# Patient Record
Sex: FEMALE | Race: BLACK OR AFRICAN AMERICAN | NOT HISPANIC OR LATINO | Employment: FULL TIME | URBAN - METROPOLITAN AREA
[De-identification: names, ages, dates, MRNs, and addresses within clinical notes are randomized per-mention and may not be internally consistent; named-entity substitution may affect disease eponyms.]

---

## 2020-03-21 ENCOUNTER — HOSPITAL ENCOUNTER (EMERGENCY)
Facility: HOSPITAL | Age: 45
Discharge: HOME | End: 2020-03-21
Attending: EMERGENCY MEDICINE
Payer: COMMERCIAL

## 2020-03-21 ENCOUNTER — NURSE TRIAGE (OUTPATIENT)
Dept: OTHER | Facility: OTHER | Age: 45
End: 2020-03-21

## 2020-03-21 VITALS
WEIGHT: 200 LBS | BODY MASS INDEX: 36.8 KG/M2 | DIASTOLIC BLOOD PRESSURE: 73 MMHG | TEMPERATURE: 100 F | HEIGHT: 62 IN | HEART RATE: 96 BPM | SYSTOLIC BLOOD PRESSURE: 115 MMHG | RESPIRATION RATE: 16 BRPM | OXYGEN SATURATION: 99 %

## 2020-03-21 DIAGNOSIS — R55 SYNCOPE: Primary | ICD-10-CM

## 2020-03-21 DIAGNOSIS — B34.9 VIRAL ILLNESS: ICD-10-CM

## 2020-03-21 DIAGNOSIS — R55 VASOVAGAL EPISODE: ICD-10-CM

## 2020-03-21 LAB
ALBUMIN SERPL BCP-MCNC: 3.1 G/DL (ref 3.5–5)
ALP SERPL-CCNC: 74 U/L (ref 46–116)
ALT SERPL W P-5'-P-CCNC: 18 U/L (ref 12–78)
ANION GAP SERPL CALCULATED.3IONS-SCNC: 6 MMOL/L (ref 4–13)
AST SERPL W P-5'-P-CCNC: 21 U/L (ref 5–45)
ATRIAL RATE: 85 BPM
BASOPHILS # BLD AUTO: 0.02 THOUSANDS/ΜL (ref 0–0.1)
BASOPHILS NFR BLD AUTO: 0 % (ref 0–1)
BILIRUB DIRECT SERPL-MCNC: 0.1 MG/DL (ref 0–0.2)
BILIRUB SERPL-MCNC: 0.2 MG/DL (ref 0.2–1)
BUN SERPL-MCNC: 11 MG/DL (ref 5–25)
CALCIUM SERPL-MCNC: 8.2 MG/DL (ref 8.3–10.1)
CHLORIDE SERPL-SCNC: 104 MMOL/L (ref 100–108)
CO2 SERPL-SCNC: 27 MMOL/L (ref 21–32)
CREAT SERPL-MCNC: 0.89 MG/DL (ref 0.6–1.3)
EOSINOPHIL # BLD AUTO: 0.03 THOUSAND/ΜL (ref 0–0.61)
EOSINOPHIL NFR BLD AUTO: 1 % (ref 0–6)
ERYTHROCYTE [DISTWIDTH] IN BLOOD BY AUTOMATED COUNT: 15.9 % (ref 11.6–15.1)
GFR SERPL CREATININE-BSD FRML MDRD: 92 ML/MIN/1.73SQ M
GLUCOSE SERPL-MCNC: 91 MG/DL (ref 65–140)
HCG SERPL QL: NEGATIVE
HCT VFR BLD AUTO: 37.4 % (ref 34.8–46.1)
HGB BLD-MCNC: 10.9 G/DL (ref 11.5–15.4)
IMM GRANULOCYTES # BLD AUTO: 0.02 THOUSAND/UL (ref 0–0.2)
IMM GRANULOCYTES NFR BLD AUTO: 0 % (ref 0–2)
LYMPHOCYTES # BLD AUTO: 0.76 THOUSANDS/ΜL (ref 0.6–4.47)
LYMPHOCYTES NFR BLD AUTO: 17 % (ref 14–44)
MCH RBC QN AUTO: 27.5 PG (ref 26.8–34.3)
MCHC RBC AUTO-ENTMCNC: 29.1 G/DL (ref 31.4–37.4)
MCV RBC AUTO: 94 FL (ref 82–98)
MONOCYTES # BLD AUTO: 0.65 THOUSAND/ΜL (ref 0.17–1.22)
MONOCYTES NFR BLD AUTO: 14 % (ref 4–12)
NEUTROPHILS # BLD AUTO: 3.09 THOUSANDS/ΜL (ref 1.85–7.62)
NEUTS SEG NFR BLD AUTO: 68 % (ref 43–75)
NRBC BLD AUTO-RTO: 0 /100 WBCS
P AXIS: 49 DEGREES
PLATELET # BLD AUTO: 320 THOUSANDS/UL (ref 149–390)
PMV BLD AUTO: 10 FL (ref 8.9–12.7)
POTASSIUM SERPL-SCNC: 3.9 MMOL/L (ref 3.5–5.3)
PR INTERVAL: 128 MS
PROT SERPL-MCNC: 7 G/DL (ref 6.4–8.2)
QRS AXIS: 15 DEGREES
QRSD INTERVAL: 70 MS
QT INTERVAL: 342 MS
QTC INTERVAL: 406 MS
RBC # BLD AUTO: 3.97 MILLION/UL (ref 3.81–5.12)
SODIUM SERPL-SCNC: 137 MMOL/L (ref 136–145)
T WAVE AXIS: 17 DEGREES
TROPONIN I SERPL-MCNC: <0.02 NG/ML
VENTRICULAR RATE: 85 BPM
WBC # BLD AUTO: 4.57 THOUSAND/UL (ref 4.31–10.16)

## 2020-03-21 PROCEDURE — 36415 COLL VENOUS BLD VENIPUNCTURE: CPT | Performed by: EMERGENCY MEDICINE

## 2020-03-21 PROCEDURE — 93010 ELECTROCARDIOGRAM REPORT: CPT | Performed by: INTERNAL MEDICINE

## 2020-03-21 PROCEDURE — 84703 CHORIONIC GONADOTROPIN ASSAY: CPT | Performed by: EMERGENCY MEDICINE

## 2020-03-21 PROCEDURE — 99285 EMERGENCY DEPT VISIT HI MDM: CPT | Performed by: EMERGENCY MEDICINE

## 2020-03-21 PROCEDURE — 80076 HEPATIC FUNCTION PANEL: CPT | Performed by: EMERGENCY MEDICINE

## 2020-03-21 PROCEDURE — 87635 SARS-COV-2 COVID-19 AMP PRB: CPT | Performed by: EMERGENCY MEDICINE

## 2020-03-21 PROCEDURE — 80048 BASIC METABOLIC PNL TOTAL CA: CPT | Performed by: EMERGENCY MEDICINE

## 2020-03-21 PROCEDURE — 84484 ASSAY OF TROPONIN QUANT: CPT | Performed by: EMERGENCY MEDICINE

## 2020-03-21 PROCEDURE — 99284 EMERGENCY DEPT VISIT MOD MDM: CPT

## 2020-03-21 PROCEDURE — 85025 COMPLETE CBC W/AUTO DIFF WBC: CPT | Performed by: EMERGENCY MEDICINE

## 2020-03-21 PROCEDURE — 93005 ELECTROCARDIOGRAM TRACING: CPT

## 2020-03-21 RX ADMIN — SODIUM CHLORIDE 1000 ML: 0.9 INJECTION, SOLUTION INTRAVENOUS at 12:22

## 2020-03-21 NOTE — TELEPHONE ENCOUNTER
Regarding: Alexander So  ----- Message from Lorraine Novoa sent at 3/21/2020 10:04 AM EDT -----  Fever 100F, coughing began Tuesday, SOB started this morning, she fainted coming out of the shower, her  found her  She was incontact with someone who tested positive for cororna virus (she was in contact last Wednesday 3/11), has not traveled internationally

## 2020-03-21 NOTE — DISCHARGE INSTRUCTIONS
Your episode is most consistent with a vasovagal episode  When your skin becomes warm and you are ill your blood shunts to your skin, not enough circulation gets to your brain any become lightheaded and pass out  Stay hydrated  Corona virus testing should result in 3-4 days we will call you  Must a quarantine for 7 days from the initial exposure and until your symptoms are gotten no fever for 72 hours

## 2020-03-21 NOTE — ED PROVIDER NOTES
History  Chief Complaint   Patient presents with    Cough     pt states she was exposed to a positive Covid pt March 11th and began with symptoms on March 16th      HPI patient is a 44-year-old female, apparently exposed to novel corona virus on March 11th with a co-worker  Patient reports feeling ill over the last few days with some generalized weakness  She reports some congestion  Patient reports some headache which she reports was fairly bad this morning progressive over the last few days and some throbbing  Patient apparently got into the shower and apparently was sitting in a warm shower then stood up and became acutely lightheaded  Patient apparently collapsed and fell down on her bathroom floor  Patient reports she woke fairly quickly  There was no report of seizure activity  Patient reports that she has no headache now  Patient was concerned because she passed out called the corona virus hotline and was referred to the emergency department because of the episode of syncope  Patient has not been tested for corona virus but believe she may have disease due to a exposure from her co-worker  Past medical history of asthma breast biopsy  Family history noncontributory  Social history nonsmoker no history of drug abuse  None       Past Medical History:   Diagnosis Date    Asthma        Past Surgical History:   Procedure Laterality Date    BREAST BIOPSY         History reviewed  No pertinent family history  I have reviewed and agree with the history as documented  E-Cigarette/Vaping     E-Cigarette/Vaping Substances     Social History     Tobacco Use    Smoking status: Never Smoker   Substance Use Topics    Alcohol use: Not Currently    Drug use: Never       Review of Systems   Constitutional: Negative for diaphoresis, fatigue and fever  HENT: Positive for congestion  Negative for ear pain, nosebleeds and sore throat      Eyes: Negative for photophobia, pain, discharge and visual disturbance  Respiratory: Negative for cough, choking, chest tightness, shortness of breath and wheezing  Cardiovascular: Negative for chest pain and palpitations  Gastrointestinal: Negative for abdominal distention, abdominal pain, diarrhea and vomiting  Genitourinary: Negative for dysuria, flank pain and frequency  Musculoskeletal: Negative for back pain, gait problem and joint swelling  Skin: Negative for color change and rash  Neurological: Positive for weakness  Negative for dizziness, syncope and headaches  Psychiatric/Behavioral: Negative for behavioral problems and confusion  The patient is not nervous/anxious  All other systems reviewed and are negative  Resolved headache, some nasal congestion and weakness  Syncope    Physical Exam  Physical Exam   Constitutional: She is oriented to person, place, and time  She appears well-developed and well-nourished  HENT:   Head: Normocephalic  Right Ear: External ear normal    Left Ear: External ear normal    Nose: Nose normal    Mouth/Throat: Oropharynx is clear and moist    Eyes: Pupils are equal, round, and reactive to light  EOM and lids are normal    Neck: Normal range of motion  Neck supple  Cardiovascular: Normal rate, regular rhythm, normal heart sounds and intact distal pulses  Pulmonary/Chest: Effort normal and breath sounds normal  No respiratory distress  Abdominal: Soft  Bowel sounds are normal  There is no tenderness  Musculoskeletal: Normal range of motion  She exhibits no deformity  Neurological: She is alert and oriented to person, place, and time  She has normal strength  No cranial nerve deficit or sensory deficit  Coordination normal  GCS eye subscore is 4  GCS verbal subscore is 5  GCS motor subscore is 6  Skin: Skin is warm and dry  Psychiatric: She has a normal mood and affect  Nursing note and vitals reviewed        Vital Signs  ED Triage Vitals   Temperature Pulse Respirations Blood Pressure SpO2 03/21/20 1147 03/21/20 1142 03/21/20 1142 03/21/20 1142 03/21/20 1142   100 °F (37 8 °C) (!) 114 16 115/73 99 %      Temp Source Heart Rate Source Patient Position - Orthostatic VS BP Location FiO2 (%)   03/21/20 1147 03/21/20 1142 03/21/20 1142 03/21/20 1142 --   Temporal Monitor Sitting Right arm       Pain Score       03/21/20 1142       6           Vitals:    03/21/20 1142 03/21/20 1149 03/21/20 1151   BP: 115/73     Pulse: (!) 114 101 96   Patient Position - Orthostatic VS: Sitting           Visual Acuity      ED Medications  Medications   sodium chloride 0 9 % bolus 1,000 mL (0 mL Intravenous Stopped 3/21/20 1322)       Diagnostic Studies  Results Reviewed     Procedure Component Value Units Date/Time    Hepatic function panel [[id number]]  (Abnormal) Collected:  03/21/20 1242    Lab Status:  Final result Specimen:  Blood from Arm, Left Updated:  03/21/20 1310     Total Bilirubin 0 20 mg/dL      Bilirubin, Direct 0 10 mg/dL      Alkaline Phosphatase 74 U/L      AST 21 U/L      ALT 18 U/L      Total Protein 7 0 g/dL      Albumin 3 1 g/dL     hCG, qualitative pregnancy [[id number]]  (Normal) Collected:  03/21/20 1242    Lab Status:  Final result Specimen:  Blood from Arm, Left Updated:  03/21/20 1310     Preg, Serum Negative    Basic metabolic panel [[id number]]  (Abnormal) Collected:  03/21/20 1242    Lab Status:  Final result Specimen:  Blood from Arm, Left Updated:  03/21/20 1304     Sodium 137 mmol/L      Potassium 3 9 mmol/L      Chloride 104 mmol/L      CO2 27 mmol/L      ANION GAP 6 mmol/L      BUN 11 mg/dL      Creatinine 0 89 mg/dL      Glucose 91 mg/dL      Calcium 8 2 mg/dL      eGFR 92 ml/min/1 73sq m     Narrative:       Felipe guidelines for Chronic Kidney Disease (CKD):     Stage 1 with normal or high GFR (GFR > 90 mL/min/1 73 square meters)    Stage 2 Mild CKD (GFR = 60-89 mL/min/1 73 square meters)    Stage 3A Moderate CKD (GFR = 45-59 mL/min/1 73 square meters)   Stage 3B Moderate CKD (GFR = 30-44 mL/min/1 73 square meters)    Stage 4 Severe CKD (GFR = 15-29 mL/min/1 73 square meters)    Stage 5 End Stage CKD (GFR <15 mL/min/1 73 square meters)  Note: GFR calculation is accurate only with a steady state creatinine    Troponin I [[id number]]  (Normal) Collected:  03/21/20 1223    Lab Status:  Final result Specimen:  Blood from Arm, Left Updated:  03/21/20 1249     Troponin I <0 02 ng/mL     CBC and differential [[id number]]  (Abnormal) Collected:  03/21/20 1223    Lab Status:  Final result Specimen:  Blood from Arm, Left Updated:  03/21/20 1229     WBC 4 57 Thousand/uL      RBC 3 97 Million/uL      Hemoglobin 10 9 g/dL      Hematocrit 37 4 %      MCV 94 fL      MCH 27 5 pg      MCHC 29 1 g/dL      RDW 15 9 %      MPV 10 0 fL      Platelets 263 Thousands/uL      nRBC 0 /100 WBCs      Neutrophils Relative 68 %      Immat GRANS % 0 %      Lymphocytes Relative 17 %      Monocytes Relative 14 %      Eosinophils Relative 1 %      Basophils Relative 0 %      Neutrophils Absolute 3 09 Thousands/µL      Immature Grans Absolute 0 02 Thousand/uL      Lymphocytes Absolute 0 76 Thousands/µL      Monocytes Absolute 0 65 Thousand/µL      Eosinophils Absolute 0 03 Thousand/µL      Basophils Absolute 0 02 Thousands/µL     2019 Novel Coronavirus (COVID-19), KARISSA [[id number]] Collected:  03/21/20 1223    Lab Status:   In process Specimen:  Nasopharyngeal Swab Updated:  03/21/20 1226                 No orders to display              Procedures  ECG 12 Lead Documentation Only  Date/Time: 3/21/2020 12:10 PM  Performed by: Penny Adame MD  Authorized by: Penny Adame MD     Indications / Diagnosis:  Syncope  ECG reviewed by me, the ED Provider: yes    Patient location:  ED  Previous ECG:     Previous ECG:  Unavailable  Interpretation:     Interpretation: normal    Rate:     ECG rate:  Eighty-five    ECG rate assessment: normal    Rhythm:     Rhythm: sinus rhythm    Comments:      Normal sinus rhythm no acute ST-T wave changes             ED Course      diagnostic testing showed normal liver functions no sign of hepatitis, pregnancy test was negative  Patient's electrolytes were within normal limits  white count was normal at 4 5 no sign of inflammation hemoglobin was 10 9 consistent with chronic anemia  Cardiac troponin was negative no sign of cardiac ischemia  Patient was asymptomatic here  MDM medical decision making 51-year-old female presents emergency department, recent exposure to someone with a novel corona virus, cold symptoms and now with an episode of syncope  Patient reports feeling lightheaded in the shower and then collapsed  Episode consistent with vasovagal episode  Discussed with patient, she is tolerating her illness well there is no hypoxia  No indication for admission for this viral infection  Patient's symptoms are most consistent with vasovagal episode no indication for admission for syncope  Discussed with patient will test her for the novel corona virus  We discussed quarantine  We discussed follow-up we discussed indications to return  Copies of the test apparently going to be sent to her family provider Baron NIELSEN       Disposition  Final diagnoses:   Syncope   Vasovagal episode   Viral illness - Novel corona virus exposure     Time reflects when diagnosis was documented in both MDM as applicable and the Disposition within this note     Time User Action Codes Description Comment    3/21/2020 11:53 AM Sinda Yohan Add [R55] Syncope     3/21/2020  1:24 PM Sinda Yohan Add [R55] Vasovagal episode     3/21/2020  1:24 PM Sinda Yohan Add [B34 9] Viral illness     3/21/2020  1:24 PM Rashad Costello [B34 9] Viral illness Novel corona virus exposure      ED Disposition     ED Disposition Condition Date/Time Comment    Discharge Stable Sat Mar 21, 2020  1:24 PM Lena Karimi discharge to home/self care              Follow-up Information None         There are no discharge medications for this patient  No discharge procedures on file      PDMP Review     None          ED Provider  Electronically Signed by           Gabriel Nguyễn MD  03/21/20 1964

## 2020-03-21 NOTE — TELEPHONE ENCOUNTER
Reason for Disposition   Patient sounds very sick or weak to the triager    Answer Assessment - Initial Assessment Questions  1  CONFIRMED CASE: "Who is the person with the confirmed COVID-19 infection that you were exposed to?"      Patient was around a co worker who tested positive for COVID 19 on 3/11/2020  Found out her co worker tested positive on 3/13/2020  2  PLACE of CONTACT: "Where were you when you were exposed to COVID-19  (coronavirus disease 2019)?" (e g , city, state, country)      [place] [employer] at work  Works as a      3  TYPE of CONTACT: "How much contact was there?" (e g , live in same house, work in same office, same school)      Work in same office  4  DATE of CONTACT: "When did you have contact with a coronavirus patient?" (e g , days)      3/11/2020    5  DURATION of CONTACT: "How long were you in contact with the COVID-19 (coronavirus disease) patient?" (e g , a few seconds, passed by person, a few minutes, live with the patient)      At least over an hour of contact  6  SYMPTOMS: "Do you have any symptoms?" (e g , fever, cough, breathing difficulty)      Started with a cough on Tuesday  Today developed a fever of 100 (temporal)  Slight SOB today this morning  States while she was in the shower felt SOB and shortly afterwards ended up fainting  States her  found her and episode probably lasted about 10 min's  Patient states she has been drinking lots of fluids  Is urinating normally  7  PREGNANCY OR POSTPARTUM: "Is there any chance you are pregnant?" "When was your last menstrual period?" "Did you deliver in the last 2 weeks?"      No chance of pregnancy  8  HIGH RISK: "Do you have any heart or lung problems? Do you have a weakened immune system?" (e g , CHF, COPD, asthma, HIV positive, chemotherapy, renal failure, diabetes mellitus, sickle cell anemia)      Seasonal Asthma      Protocols used: CORONAVIRUS (COVID-19) EXPOSURE-ADULT-

## 2020-03-28 ENCOUNTER — TRANSCRIBE ORDERS (OUTPATIENT)
Dept: LAB | Facility: HOSPITAL | Age: 45
End: 2020-03-28

## 2020-03-28 LAB — SARS-COV-2 RNA SPEC QL NAA+PROBE: DETECTED

## 2020-03-28 NOTE — RESULT ENCOUNTER NOTE
Called and discussed positive COVID test results with patient via telephone  She is still having flu like symptoms  Discussed with patient that Patients with COVID should remain on self-quarantine until 7 days after the time of initial symptom onset OR 72 hours after resolution of fever (without antipyretics) and symptoms, whichever is longer   Discussed she should return to ED if she develops and significantly worsening shortness of breath or any other worrisome symptoms (repetitive vomiting, syncope, etc)

## 2020-05-01 ENCOUNTER — PATIENT OUTREACH (OUTPATIENT)
Dept: CASE MANAGEMENT | Facility: OTHER | Age: 45
End: 2020-05-01

## 2020-05-06 ENCOUNTER — TELEPHONE (OUTPATIENT)
Dept: PULMONOLOGY | Facility: CLINIC | Age: 45
End: 2020-05-06

## 2021-02-15 ENCOUNTER — TRANSCRIBE ORDERS (OUTPATIENT)
Dept: PHYSICAL THERAPY | Facility: CLINIC | Age: 46
End: 2021-02-15

## 2021-02-15 ENCOUNTER — EVALUATION (OUTPATIENT)
Dept: PHYSICAL THERAPY | Facility: CLINIC | Age: 46
End: 2021-02-15
Payer: COMMERCIAL

## 2021-02-15 DIAGNOSIS — M25.561 RIGHT KNEE PAIN, UNSPECIFIED CHRONICITY: ICD-10-CM

## 2021-02-15 DIAGNOSIS — M25.561 CHRONIC PAIN OF BOTH KNEES: Primary | ICD-10-CM

## 2021-02-15 DIAGNOSIS — M25.562 LEFT KNEE PAIN, UNSPECIFIED CHRONICITY: Primary | ICD-10-CM

## 2021-02-15 DIAGNOSIS — M25.562 CHRONIC PAIN OF BOTH KNEES: Primary | ICD-10-CM

## 2021-02-15 DIAGNOSIS — G89.29 CHRONIC PAIN OF BOTH KNEES: Primary | ICD-10-CM

## 2021-02-15 PROCEDURE — 97161 PT EVAL LOW COMPLEX 20 MIN: CPT | Performed by: PHYSICAL THERAPIST

## 2021-02-15 PROCEDURE — 97535 SELF CARE MNGMENT TRAINING: CPT | Performed by: PHYSICAL THERAPIST

## 2021-02-15 RX ORDER — CYCLOBENZAPRINE HCL 5 MG
5 TABLET ORAL 2 TIMES DAILY PRN
COMMUNITY

## 2021-02-15 RX ORDER — MELOXICAM 15 MG/1
15 TABLET ORAL DAILY
COMMUNITY

## 2021-02-15 NOTE — PROGRESS NOTES
PT Evaluation     Today's date: 2/15/2021  Patient name: Marii Estrella  : 1975  MRN: [id number]  Referring provider: Divya Mendoza MD  Dx:   Encounter Diagnosis     ICD-10-CM    1  Left knee pain, unspecified chronicity  M25 562    2  Right knee pain, unspecified chronicity  M25 561        Start Time: 0910  Stop Time: 0950  Total time in clinic (min): 40 minutes    Assessment  Assessment details: Pt presents with c/c of B/L knee pain  PT notes the patient with decreased mm strength of B/L knees and LEs, R>L  Knee ROM and LE mm flexibility WNL  Minor pain reported at end ranges of knee ROM  Increased static valgus noted at B/L knees in weight bearing  Meniscal testing (+) for medial meniscus on the left and lateral meniscus on the right  Increased patellar and tibiofemoral joint mobility noted on the right compared to the left  Current functional limitations include difficulty with transfers, decreased stair mobility, and decreased activity tolerance  Discussed findings of evaluation with the patient, current limitations, and POC to address deficits  Pt would benefit from course of skilled therapy to decrease symptoms and restore normal functional level  Prognosis is good with adherence to skilled PT 2-3x/wk and compliance to HEP  Based upon examination, no other referral appears necessary at this time  Impairments: abnormal gait, abnormal or restricted ROM, activity intolerance, impaired balance, impaired physical strength, lacks appropriate home exercise program and pain with function  Understanding of Dx/Px/POC: good   Prognosis: good    Goals  Short Term Goals  1  Pt will decrease pain in B/L knees 25-50% in 4 wks   2  Pt will improve knee/LE mm strength to 5/5 t/o in 4 wks   3  Pt will be independent with HEP in 4 wks     Long Term Goals  1  Pt will decrease pain in B/L knees % in 8 wks   2  Pt will report improved dynamic stability with functional activities in 8 wks   3   Pt will return to all previous activities without symptoms in 8 wks     Plan  Patient would benefit from: PT eval and skilled physical therapy  Referral necessary: No  Planned modality interventions: cryotherapy and thermotherapy: hydrocollator packs  Planned therapy interventions: balance, flexibility, functional ROM exercises, gait training, graded exercise, home exercise program, joint mobilization, manual therapy, neuromuscular re-education, patient education, strengthening, stretching and therapeutic exercise  Frequency: 2x week  Duration in weeks: 12  Treatment plan discussed with: patient        Subjective Evaluation    History of Present Illness  Mechanism of injury: Pt presents with c/c of B/L knee pain  Reports symptoms began a few months ago with no specific onset of injury noted  Had MRI of B/L knees showing complex medial meniscus tear of the left knee, Baker's cyst in left knee, and moderate joint effusion in B/L knees  No ligament tears B/L knees  Currently reports "pulling" in the right posterior knee when transferring sit to stand, some spasms around the left knee, cramping, and popping in B/L, R>L, which can be painful  Had cortisone injection in left knee last week noting decreased swelling and pain in the left knee  Denies grinding, altered sensation  Currently working from home as a   Currently presents with orders for OPPT     Quality of life: good    Pain  Current pain ratin  At best pain ratin  At worst pain ratin  Location: B/L knees   Quality: cramping and tight (pulling)  Relieving factors: medications and heat  Aggravating factors: stair climbing  Progression: no change      Diagnostic Tests  X-ray: abnormal  MRI studies: abnormal  Treatments  Previous treatment: injection treatment  Current treatment: physical therapy  Patient Goals  Patient goals for therapy: decreased pain, decreased edema, improved balance, increased motion, increased strength, independence with ADLs/IADLs and return to sport/leisure activities          Objective     Static Posture     Knee   Genu valgus  Knee (Left): Recurvatum  Knee (Right): Recurvatum  Tenderness   Left Knee   Tenderness in the medial joint line  No tenderness in the inferior fat pad, inferior patella, ITB, lateral joint line, lateral patella, medial patella, patellar tendon, pes anserinus, popliteal fossa, quadriceps tendon, superior patella and tibial tubercle  Right Knee   Tenderness in the lateral joint line  No tenderness in the inferior fat pad, inferior patella, ITB, lateral patella, medial joint line, medial patella, patellar tendon, pes anserinus, popliteal fossa, quadriceps tendon, superior patella and tibial tubercle  Neurological Testing     Sensation     Knee   Left Knee   Intact: light touch    Right Knee   Intact: light touch     Active Range of Motion   Left Knee   Flexion: 122 degrees with pain  Extension: 5 degrees with pain    Right Knee   Flexion: 120 degrees   Extension: 5 degrees with pain    Additional Active Range of Motion Details  Hip flexibility WNL B/L   Discomfort noted at available end ranges     Mobility   Patellar Mobility:   Left Knee   WFL: superior and inferior  Hypermobile: left medial      Right Knee   WFL: superior and inferior  Hypermobile: medial and lateral   Tibiofemoral Mobility:   Left knee   Tibiofemoral tendons within functional limits include the anterior and posterior  Right knee Hypermobile in the anterior and posterior tibiofemoral tendon(s)      Patellar Static Positioning   Left Knee: WFL  Right Knee: nicole    Strength/Myotome Testing     Left Hip   Planes of Motion   Flexion: 4+  Extension: 5  Abduction: 5  Adduction: 4+  External rotation: 4  Internal rotation: 4    Right Hip   Planes of Motion   Flexion: 4+  Extension: 4+  Abduction: 4+  Adduction: 4+  External rotation: 4  Internal rotation: 4    Left Knee   Flexion: 4+  Extension: 4+  Quadriceps contraction: good    Right Knee Flexion: 4  Extension: 4  Quadriceps contraction: fair    Additional Strength Details  No pain reported with resisted MMT     Tests     Left Knee   Positive medial Mckay and varus stress test at 30 degrees  Negative anterior drawer, anterior Lachman, lateral Mckay, patellar apprehension, posterior drawer, posterior sag, valgus stress test at 0 degrees, valgus stress test at 30 degrees and varus stress test at 0 degrees  Right Knee   Positive lateral Mckay and varus stress test at 30 degrees  Negative anterior drawer, anterior Lachman, medial Mckay, patellar apprehension, posterior drawer, posterior sag, valgus stress test at 0 degrees, valgus stress test at 30 degrees and varus stress test at 0 degrees  Additional Tests Details  Increased laxity noted with varus stress B/L, no pain     Ambulation     Ambulation: Stairs   Pattern: non-reciprocal  Pattern: reciprocal    Additional Stairs Ambulation Details  Ambulates with left foot up     Quality of Movement During Gait     Knee    Knee (Left): Positive valgus  Knee (Right): Positive valgus  Functional Assessment      Squat    Pain, trunk lean left and sitting toward left side  Forward Step Up 8"   Left Leg  Within functional limits  Right Leg  Pain, increased forward trunk lean and increased contralateral push off                   Precautions N/A       Manuals 2/15/21                       Neuro Re-Ed         EO/EC Foam        SLS        Tandem Stance         BOSU Lunge                        Ther Ex        [id number] [facility]        TR/HR         Squat w/ TB Abd         TB TKE         LAQ        SLR Flex/Abd        SAQ        Leg Press                         HEP Provided        Ther Activity        LE Stretching                 Gait Training                        Modalities        MHP/CP Prn

## 2021-02-15 NOTE — LETTER
February 15, 2021    Bia Green MD  [address] [address]    Patient: Kalia Wan   YOB: 1975   Date of Visit: 2/15/2021     Encounter Diagnosis     ICD-10-CM    1  Left knee pain, unspecified chronicity  M25 562    2  Right knee pain, unspecified chronicity  M25 561        Dear Dr Bianca Borrego: Thank you for your recent referral of Kalia Wan  Please review the attached evaluation summary from Betzaida's recent visit  Please verify that you agree with the plan of care by signing the attached order  If you have any questions or concerns, please do not hesitate to call  I sincerely appreciate the opportunity to share in the care of one of your patients and hope to have another opportunity to work with you in the near future  Sincerely,    Telma Zamora, PT      Referring Provider:      I certify that I have read the below Plan of Care and certify the need for these services furnished under this plan of treatment while under my care  Bia Green MD  [address] [address]  Via Fax: [phone]          PT Evaluation     Today's date: 2/15/2021  Patient name: Kalia Wan  : 1975  MRN: [id number]  Referring provider: Amee Vincent MD  Dx:   Encounter Diagnosis     ICD-10-CM    1  Left knee pain, unspecified chronicity  M25 562    2  Right knee pain, unspecified chronicity  M25 561        Start Time: 0910  Stop Time: 0950  Total time in clinic (min): 40 minutes    Assessment  Assessment details: Pt presents with c/c of B/L knee pain  PT notes the patient with decreased mm strength of B/L knees and LEs, R>L  Knee ROM and LE mm flexibility WNL  Minor pain reported at end ranges of knee ROM  Increased static valgus noted at B/L knees in weight bearing  Meniscal testing (+) for medial meniscus on the left and lateral meniscus on the right   Increased patellar and tibiofemoral joint mobility noted on the right compared to the left  Current functional limitations include difficulty with transfers, decreased stair mobility, and decreased activity tolerance  Discussed findings of evaluation with the patient, current limitations, and POC to address deficits  Pt would benefit from course of skilled therapy to decrease symptoms and restore normal functional level  Prognosis is good with adherence to skilled PT 2-3x/wk and compliance to HEP  Based upon examination, no other referral appears necessary at this time  Impairments: abnormal gait, abnormal or restricted ROM, activity intolerance, impaired balance, impaired physical strength, lacks appropriate home exercise program and pain with function  Understanding of Dx/Px/POC: good   Prognosis: good    Goals  Short Term Goals  1  Pt will decrease pain in B/L knees 25-50% in 4 wks   2  Pt will improve knee/LE mm strength to 5/5 t/o in 4 wks   3  Pt will be independent with HEP in 4 wks     Long Term Goals  1  Pt will decrease pain in B/L knees % in 8 wks   2  Pt will report improved dynamic stability with functional activities in 8 wks   3  Pt will return to all previous activities without symptoms in 8 wks     Plan  Patient would benefit from: PT eval and skilled physical therapy  Referral necessary: No  Planned modality interventions: cryotherapy and thermotherapy: hydrocollator packs  Planned therapy interventions: balance, flexibility, functional ROM exercises, gait training, graded exercise, home exercise program, joint mobilization, manual therapy, neuromuscular re-education, patient education, strengthening, stretching and therapeutic exercise  Frequency: 2x week  Duration in weeks: 12  Treatment plan discussed with: patient        Subjective Evaluation    History of Present Illness  Mechanism of injury: Pt presents with c/c of B/L knee pain  Reports symptoms began a few months ago with no specific onset of injury noted   Had MRI of B/L knees showing complex medial meniscus tear of the left knee, Baker's cyst in left knee, and moderate joint effusion in B/L knees  No ligament tears B/L knees  Currently reports "pulling" in the right posterior knee when transferring sit to stand, some spasms around the left knee, cramping, and popping in B/L, R>L, which can be painful  Had cortisone injection in left knee last week noting decreased swelling and pain in the left knee  Denies grinding, altered sensation  Currently working from home as a   Currently presents with orders for OPPT  Quality of life: good    Pain  Current pain ratin  At best pain ratin  At worst pain ratin  Location: B/L knees   Quality: cramping and tight (pulling)  Relieving factors: medications and heat  Aggravating factors: stair climbing  Progression: no change      Diagnostic Tests  X-ray: abnormal  MRI studies: abnormal  Treatments  Previous treatment: injection treatment  Current treatment: physical therapy  Patient Goals  Patient goals for therapy: decreased pain, decreased edema, improved balance, increased motion, increased strength, independence with ADLs/IADLs and return to sport/leisure activities          Objective     Static Posture     Knee   Genu valgus  Knee (Left): Recurvatum  Knee (Right): Recurvatum  Tenderness   Left Knee   Tenderness in the medial joint line  No tenderness in the inferior fat pad, inferior patella, ITB, lateral joint line, lateral patella, medial patella, patellar tendon, pes anserinus, popliteal fossa, quadriceps tendon, superior patella and tibial tubercle  Right Knee   Tenderness in the lateral joint line  No tenderness in the inferior fat pad, inferior patella, ITB, lateral patella, medial joint line, medial patella, patellar tendon, pes anserinus, popliteal fossa, quadriceps tendon, superior patella and tibial tubercle       Neurological Testing     Sensation     Knee   Left Knee   Intact: light touch    Right Knee Intact: light touch     Active Range of Motion   Left Knee   Flexion: 122 degrees with pain  Extension: 5 degrees with pain    Right Knee   Flexion: 120 degrees   Extension: 5 degrees with pain    Additional Active Range of Motion Details  Hip flexibility WNL B/L   Discomfort noted at available end ranges     Mobility   Patellar Mobility:   Left Knee   WFL: superior and inferior  Hypermobile: left medial      Right Knee   WFL: superior and inferior  Hypermobile: medial and lateral   Tibiofemoral Mobility:   Left knee   Tibiofemoral tendons within functional limits include the anterior and posterior  Right knee Hypermobile in the anterior and posterior tibiofemoral tendon(s)  Patellar Static Positioning   Left Knee: WFL  Right Knee: nicole    Strength/Myotome Testing     Left Hip   Planes of Motion   Flexion: 4+  Extension: 5  Abduction: 5  Adduction: 4+  External rotation: 4  Internal rotation: 4    Right Hip   Planes of Motion   Flexion: 4+  Extension: 4+  Abduction: 4+  Adduction: 4+  External rotation: 4  Internal rotation: 4    Left Knee   Flexion: 4+  Extension: 4+  Quadriceps contraction: good    Right Knee   Flexion: 4  Extension: 4  Quadriceps contraction: fair    Additional Strength Details  No pain reported with resisted MMT     Tests     Left Knee   Positive medial Mckay and varus stress test at 30 degrees  Negative anterior drawer, anterior Lachman, lateral Mckay, patellar apprehension, posterior drawer, posterior sag, valgus stress test at 0 degrees, valgus stress test at 30 degrees and varus stress test at 0 degrees  Right Knee   Positive lateral Mckay and varus stress test at 30 degrees  Negative anterior drawer, anterior Lachman, medial Mckay, patellar apprehension, posterior drawer, posterior sag, valgus stress test at 0 degrees, valgus stress test at 30 degrees and varus stress test at 0 degrees       Additional Tests Details  Increased laxity noted with varus stress B/L, no pain     Ambulation     Ambulation: Stairs   Pattern: non-reciprocal  Pattern: reciprocal    Additional Stairs Ambulation Details  Ambulates with left foot up     Quality of Movement During Gait     Knee    Knee (Left): Positive valgus  Knee (Right): Positive valgus  Functional Assessment      Squat    Pain, trunk lean left and sitting toward left side  Forward Step Up 8"   Left Leg  Within functional limits  Right Leg  Pain, increased forward trunk lean and increased contralateral push off                   Precautions N/A       Manuals 2/15/21                       Neuro Re-Ed         EO/EC Foam        SLS        Tandem Stance         BOSU Lunge                        Ther Ex        [facility]        TR/HR         Squat w/ TB Abd         TB TKE         LAQ        SLR Flex/Abd        SAQ        Leg Press                         HEP Provided        Ther Activity        LE Stretching                 Gait Training                        Modalities        MHP/CP Prn

## 2021-02-17 ENCOUNTER — APPOINTMENT (OUTPATIENT)
Dept: PHYSICAL THERAPY | Facility: CLINIC | Age: 46
End: 2021-02-17
Payer: COMMERCIAL

## 2021-02-19 ENCOUNTER — APPOINTMENT (OUTPATIENT)
Dept: PHYSICAL THERAPY | Facility: CLINIC | Age: 46
End: 2021-02-19
Payer: COMMERCIAL

## 2021-02-22 ENCOUNTER — OFFICE VISIT (OUTPATIENT)
Dept: PHYSICAL THERAPY | Facility: CLINIC | Age: 46
End: 2021-02-22
Payer: COMMERCIAL

## 2021-02-22 DIAGNOSIS — M25.561 RIGHT KNEE PAIN, UNSPECIFIED CHRONICITY: ICD-10-CM

## 2021-02-22 DIAGNOSIS — M25.562 LEFT KNEE PAIN, UNSPECIFIED CHRONICITY: Primary | ICD-10-CM

## 2021-02-22 PROCEDURE — 97110 THERAPEUTIC EXERCISES: CPT

## 2021-02-22 PROCEDURE — 97530 THERAPEUTIC ACTIVITIES: CPT

## 2021-02-22 NOTE — PROGRESS NOTES
Daily Note     Today's date: 2021  Patient name: Svitlana Alicea  : 1975  MRN: [id number]  Referring provider: Edwin Montalvo MD  Dx:   Encounter Diagnosis     ICD-10-CM    1  Left knee pain, unspecified chronicity  M25 562    2  Right knee pain, unspecified chronicity  M25 561                   Subjective: The patient states that she is doing ok  Objective: See treatment diary below      Assessment: Tolerated treatment well  Patient demonstrated fatigue post treatment, exhibited good technique with therapeutic exercises and would benefit from continued PT  Patient had good ROM in her B LEs  The patient did have cracking in her knees with squats  Plan: Continue per plan of care           Precautions N/A       Manuals 2/15/21 2/22/21                      Neuro Re-Ed         EO/EC Foam        SLS        Tandem Stance         BOSU Lunge                        Ther Ex        SRC  NuStep L1      TR/HR   30x      Squat w/ TB Abd   20x      TB TKE   30x Green      LAQ  30x ea      SLR Flex/Abd  2# 30x      SAQ  2# 30x      Leg Press   20# 30x                      HEP Provided        Ther Activity        LE Stretching   15'              Gait Training                        Modalities        MHP/CP Prn  Declined

## 2021-02-24 ENCOUNTER — OFFICE VISIT (OUTPATIENT)
Dept: PHYSICAL THERAPY | Facility: CLINIC | Age: 46
End: 2021-02-24
Payer: COMMERCIAL

## 2021-02-24 DIAGNOSIS — M25.562 LEFT KNEE PAIN, UNSPECIFIED CHRONICITY: Primary | ICD-10-CM

## 2021-02-24 DIAGNOSIS — M25.561 RIGHT KNEE PAIN, UNSPECIFIED CHRONICITY: ICD-10-CM

## 2021-02-24 PROCEDURE — 97110 THERAPEUTIC EXERCISES: CPT

## 2021-02-24 PROCEDURE — 97530 THERAPEUTIC ACTIVITIES: CPT

## 2021-02-24 NOTE — PROGRESS NOTES
Daily Note     Today's date: 2021  Patient name: Poppy Lindquist  : 1975  MRN: [id number]  Referring provider: Sarah Nunez MD  Dx:   Encounter Diagnosis     ICD-10-CM    1  Left knee pain, unspecified chronicity  M25 562    2  Right knee pain, unspecified chronicity  M25 561                   Subjective: The patient states that she is pretty sore today  Objective: See treatment diary below      Assessment: Tolerated treatment well  Patient exhibited good technique with therapeutic exercises and would benefit from continued PT  Treatment modified today secondary to increase in pain and swelling  Continue to work on increasing strength  Plan: Continue per plan of care           Precautions N/A       Manuals 2/15/21 2/22/21 2/24/21                     Neuro Re-Ed         EO/EC Foam        SLS        Tandem Stance         BOSU Lunge                        Ther Ex        SRC  NuStep L1 L1 12'     TR/HR   30x 30x     Squat w/ TB Abd   20x 30x     TB TKE   30x Green 30x Green     LAQ  30x ea 30x     SLR Flex/Abd  2# 30x 2# Flex     SAQ  2# 30x 2# 30x     Leg Press   20# 30x      Hip Add   30x 5"     Bridge   30x     HEP Provided        Ther Activity        LE Stretching   15' 15'             Gait Training                        Modalities        MHP/CP Prn  Declined 5' CP

## 2021-03-01 ENCOUNTER — OFFICE VISIT (OUTPATIENT)
Dept: PHYSICAL THERAPY | Facility: CLINIC | Age: 46
End: 2021-03-01
Payer: COMMERCIAL

## 2021-03-01 DIAGNOSIS — M25.561 RIGHT KNEE PAIN, UNSPECIFIED CHRONICITY: ICD-10-CM

## 2021-03-01 DIAGNOSIS — M25.562 LEFT KNEE PAIN, UNSPECIFIED CHRONICITY: Primary | ICD-10-CM

## 2021-03-01 PROCEDURE — 97110 THERAPEUTIC EXERCISES: CPT | Performed by: PHYSICAL THERAPIST

## 2021-03-01 NOTE — PROGRESS NOTES
Daily Note     Today's date: 3/1/2021  Patient name: Nanette Mendez  : 1975  MRN: [id number]  Referring provider: Matt Wyatt MD  Dx:   Encounter Diagnosis     ICD-10-CM    1  Left knee pain, unspecified chronicity  M25 562    2  Right knee pain, unspecified chronicity  M25 561        Start Time: 0900  Stop Time: 0945  Total time in clinic (min): 45 minutes    Subjective: Pt continues to report "popping" in the right knee with squatting and stairs  Objective: See treatment diary below      Assessment: Tolerated treatment well  Pain and popping noted with squats, held  No pain reported with other exercises  Updated HEP to include self stretches  Continue to progress as tolerated  Patient would benefit from continued PT      Plan: Continue per plan of care           Precautions N/A       Manuals 2/15/21 2/22/21 2/24/21 3/1/21                    Neuro Re-Ed         EO/EC Foam        SLS        Tandem Stance         BOSU Lunge                        Ther Ex        SRC  NuStep L1 L1 12' NuStep L3 12'    TR/HR   30x 30x 30x     Squat w/ TB Abd   20x 30x 15x (Pain)    TB TKE   30x Green 30x Green 30x Green    LAQ  30x ea 30x 30x 2#    SLR Flex/Abd  2# 30x 2# Flex 30x 2# Flex    SAQ  2# 30x 2# 30x 30x 2#    Leg Press   20# 30x      Hip Add   30x 5" 30x 5"    Bridge   30x 30x     HEP Provided    Updated     Ther Activity        LE Stretching   15' 15' NP            Gait Training                        Modalities        MHP/CP Prn  Declined 5' CP Defer

## 2021-03-03 ENCOUNTER — APPOINTMENT (OUTPATIENT)
Dept: PHYSICAL THERAPY | Facility: CLINIC | Age: 46
End: 2021-03-03
Payer: COMMERCIAL

## 2021-03-08 ENCOUNTER — OFFICE VISIT (OUTPATIENT)
Dept: PHYSICAL THERAPY | Facility: CLINIC | Age: 46
End: 2021-03-08
Payer: COMMERCIAL

## 2021-03-08 DIAGNOSIS — M25.562 LEFT KNEE PAIN, UNSPECIFIED CHRONICITY: Primary | ICD-10-CM

## 2021-03-08 DIAGNOSIS — M25.561 RIGHT KNEE PAIN, UNSPECIFIED CHRONICITY: ICD-10-CM

## 2021-03-08 PROCEDURE — 97110 THERAPEUTIC EXERCISES: CPT

## 2021-03-08 NOTE — PROGRESS NOTES
Daily Note     Today's date: 3/8/2021  Patient name: Cassy Goyal  : 1975  MRN: [id number]  Referring provider: Annie Zuniga MD  Dx:   Encounter Diagnosis     ICD-10-CM    1  Left knee pain, unspecified chronicity  M25 562    2  Right knee pain, unspecified chronicity  M25 561                   Subjective: The patient states that she had some weakness in her R knee over the weekend  Objective: See treatment diary below      Assessment: Tolerated treatment well  Patient exhibited good technique with therapeutic exercises and would benefit from continued PT  The patient did have weakness in her quadriceps muscles  Plan: Continue per plan of care           Precautions N/A       Manuals 2/15/21 2/22/21 2/24/21 3/1/21 3/8/21                   Neuro Re-Ed         EO/EC Foam        SLS        Tandem Stance         BOSU Lunge                        Ther Ex        SRC  NuStep L1 L1 12' NuStep L3 12' NuStep L3 12'   TR/HR   30x 30x 30x     Squat w/ TB Abd   20x 30x 15x (Pain) 20x    TB TKE   30x Green 30x Green 30x Green    LAQ  30x ea 30x 30x 2# 30x 2#   SLR Flex/Abd  2# 30x 2# Flex 30x 2# Flex 30x 2#   SAQ  2# 30x 2# 30x 30x 2# 30x 2#   Leg Press   20# 30x      Hip Add   30x 5" 30x 5" 30x 5"   Bridge   30x 30x  30x   Self Stretches (HS/Calf/Hip Flex)     10 x 10"   HEP Provided    Updated     Ther Activity        LE Stretching   15' 15' NP NP           Gait Training                        Modalities        MHP/CP Prn  Declined 5' CP Defer  Declined

## 2021-03-10 ENCOUNTER — APPOINTMENT (OUTPATIENT)
Dept: PHYSICAL THERAPY | Facility: CLINIC | Age: 46
End: 2021-03-10
Payer: COMMERCIAL

## 2021-04-02 ENCOUNTER — APPOINTMENT (EMERGENCY)
Dept: CT IMAGING | Facility: HOSPITAL | Age: 46
End: 2021-04-02
Payer: COMMERCIAL

## 2021-04-02 ENCOUNTER — HOSPITAL ENCOUNTER (OUTPATIENT)
Facility: HOSPITAL | Age: 46
Setting detail: OBSERVATION
Discharge: HOME | End: 2021-04-03
Attending: EMERGENCY MEDICINE | Admitting: STUDENT IN AN ORGANIZED HEALTH CARE EDUCATION/TRAINING PROGRAM
Payer: COMMERCIAL

## 2021-04-02 DIAGNOSIS — R10.9 RIGHT FLANK PAIN: ICD-10-CM

## 2021-04-02 DIAGNOSIS — R10.11 RIGHT UPPER QUADRANT ABDOMINAL PAIN: Primary | ICD-10-CM

## 2021-04-02 LAB
ALBUMIN SERPL BCP-MCNC: 3.4 G/DL (ref 3.5–5)
ALP SERPL-CCNC: 87 U/L (ref 46–116)
ALT SERPL W P-5'-P-CCNC: 14 U/L (ref 12–78)
ANION GAP SERPL CALCULATED.3IONS-SCNC: 5 MMOL/L (ref 4–13)
AST SERPL W P-5'-P-CCNC: 13 U/L (ref 5–45)
BASOPHILS # BLD AUTO: 0.05 THOUSANDS/ΜL (ref 0–0.1)
BASOPHILS NFR BLD AUTO: 1 % (ref 0–1)
BILIRUB SERPL-MCNC: 0.29 MG/DL (ref 0.2–1)
BILIRUB UR QL STRIP: NEGATIVE
BUN SERPL-MCNC: 12 MG/DL (ref 5–25)
CALCIUM ALBUM COR SERPL-MCNC: 8.3 MG/DL (ref 8.3–10.1)
CALCIUM SERPL-MCNC: 7.8 MG/DL (ref 8.3–10.1)
CHLORIDE SERPL-SCNC: 103 MMOL/L (ref 100–108)
CLARITY UR: CLEAR
CO2 SERPL-SCNC: 28 MMOL/L (ref 21–32)
COLOR UR: YELLOW
CREAT SERPL-MCNC: 0.82 MG/DL (ref 0.6–1.3)
EOSINOPHIL # BLD AUTO: 0.16 THOUSAND/ΜL (ref 0–0.61)
EOSINOPHIL NFR BLD AUTO: 3 % (ref 0–6)
ERYTHROCYTE [DISTWIDTH] IN BLOOD BY AUTOMATED COUNT: 19 % (ref 11.6–15.1)
EXT PREG TEST URINE: NORMAL
EXT. CONTROL ED NAV: NORMAL
GFR SERPL CREATININE-BSD FRML MDRD: 101 ML/MIN/1.73SQ M
GLUCOSE SERPL-MCNC: 90 MG/DL (ref 65–140)
GLUCOSE UR STRIP-MCNC: NEGATIVE MG/DL
HCT VFR BLD AUTO: 38.5 % (ref 34.8–46.1)
HGB BLD-MCNC: 11.8 G/DL (ref 11.5–15.4)
HGB UR QL STRIP.AUTO: NEGATIVE
IMM GRANULOCYTES # BLD AUTO: 0.02 THOUSAND/UL (ref 0–0.2)
IMM GRANULOCYTES NFR BLD AUTO: 0 % (ref 0–2)
KETONES UR STRIP-MCNC: ABNORMAL MG/DL
LEUKOCYTE ESTERASE UR QL STRIP: NEGATIVE
LIPASE SERPL-CCNC: 73 U/L (ref 73–393)
LYMPHOCYTES # BLD AUTO: 1.77 THOUSANDS/ΜL (ref 0.6–4.47)
LYMPHOCYTES NFR BLD AUTO: 27 % (ref 14–44)
MCH RBC QN AUTO: 28.7 PG (ref 26.8–34.3)
MCHC RBC AUTO-ENTMCNC: 30.6 G/DL (ref 31.4–37.4)
MCV RBC AUTO: 94 FL (ref 82–98)
MONOCYTES # BLD AUTO: 0.62 THOUSAND/ΜL (ref 0.17–1.22)
MONOCYTES NFR BLD AUTO: 10 % (ref 4–12)
NEUTROPHILS # BLD AUTO: 3.86 THOUSANDS/ΜL (ref 1.85–7.62)
NEUTS SEG NFR BLD AUTO: 59 % (ref 43–75)
NITRITE UR QL STRIP: NEGATIVE
NRBC BLD AUTO-RTO: 0 /100 WBCS
PH UR STRIP.AUTO: 7 [PH]
PLATELET # BLD AUTO: 313 THOUSANDS/UL (ref 149–390)
PMV BLD AUTO: 10 FL (ref 8.9–12.7)
POTASSIUM SERPL-SCNC: 4.4 MMOL/L (ref 3.5–5.3)
PROT SERPL-MCNC: 7.8 G/DL (ref 6.4–8.2)
PROT UR STRIP-MCNC: NEGATIVE MG/DL
RBC # BLD AUTO: 4.11 MILLION/UL (ref 3.81–5.12)
SODIUM SERPL-SCNC: 136 MMOL/L (ref 136–145)
SP GR UR STRIP.AUTO: 1.01 (ref 1–1.03)
UROBILINOGEN UR QL STRIP.AUTO: 1 E.U./DL
WBC # BLD AUTO: 6.48 THOUSAND/UL (ref 4.31–10.16)

## 2021-04-02 PROCEDURE — 80053 COMPREHEN METABOLIC PANEL: CPT | Performed by: EMERGENCY MEDICINE

## 2021-04-02 PROCEDURE — 36415 COLL VENOUS BLD VENIPUNCTURE: CPT

## 2021-04-02 PROCEDURE — 85025 COMPLETE CBC W/AUTO DIFF WBC: CPT | Performed by: EMERGENCY MEDICINE

## 2021-04-02 PROCEDURE — 81025 URINE PREGNANCY TEST: CPT | Performed by: PHYSICIAN ASSISTANT

## 2021-04-02 PROCEDURE — 74177 CT ABD & PELVIS W/CONTRAST: CPT

## 2021-04-02 PROCEDURE — 99285 EMERGENCY DEPT VISIT HI MDM: CPT

## 2021-04-02 PROCEDURE — 96374 THER/PROPH/DIAG INJ IV PUSH: CPT

## 2021-04-02 PROCEDURE — 99284 EMERGENCY DEPT VISIT MOD MDM: CPT | Performed by: PHYSICIAN ASSISTANT

## 2021-04-02 PROCEDURE — 83690 ASSAY OF LIPASE: CPT | Performed by: EMERGENCY MEDICINE

## 2021-04-02 PROCEDURE — G1004 CDSM NDSC: HCPCS

## 2021-04-02 PROCEDURE — 81003 URINALYSIS AUTO W/O SCOPE: CPT | Performed by: PHYSICIAN ASSISTANT

## 2021-04-02 RX ORDER — KETOROLAC TROMETHAMINE 30 MG/ML
30 INJECTION, SOLUTION INTRAMUSCULAR; INTRAVENOUS ONCE
Status: COMPLETED | OUTPATIENT
Start: 2021-04-02 | End: 2021-04-02

## 2021-04-02 RX ADMIN — KETOROLAC TROMETHAMINE 30 MG: 30 INJECTION, SOLUTION INTRAMUSCULAR at 22:31

## 2021-04-02 RX ADMIN — IOHEXOL 100 ML: 350 INJECTION, SOLUTION INTRAVENOUS at 23:17

## 2021-04-03 VITALS
TEMPERATURE: 98 F | RESPIRATION RATE: 16 BRPM | DIASTOLIC BLOOD PRESSURE: 80 MMHG | BODY MASS INDEX: 36.8 KG/M2 | WEIGHT: 200 LBS | HEART RATE: 71 BPM | SYSTOLIC BLOOD PRESSURE: 139 MMHG | OXYGEN SATURATION: 100 % | HEIGHT: 62 IN

## 2021-04-03 LAB
ALBUMIN SERPL BCP-MCNC: 3 G/DL (ref 3.5–5)
ALP SERPL-CCNC: 75 U/L (ref 46–116)
ALT SERPL W P-5'-P-CCNC: 15 U/L (ref 12–78)
ANION GAP SERPL CALCULATED.3IONS-SCNC: 6 MMOL/L (ref 4–13)
AST SERPL W P-5'-P-CCNC: 14 U/L (ref 5–45)
BASOPHILS # BLD AUTO: 0.03 THOUSANDS/ΜL (ref 0–0.1)
BASOPHILS NFR BLD AUTO: 1 % (ref 0–1)
BILIRUB SERPL-MCNC: 0.33 MG/DL (ref 0.2–1)
BUN SERPL-MCNC: 13 MG/DL (ref 5–25)
CALCIUM ALBUM COR SERPL-MCNC: 8.5 MG/DL (ref 8.3–10.1)
CALCIUM SERPL-MCNC: 7.7 MG/DL (ref 8.3–10.1)
CHLORIDE SERPL-SCNC: 105 MMOL/L (ref 100–108)
CO2 SERPL-SCNC: 25 MMOL/L (ref 21–32)
CREAT SERPL-MCNC: 0.72 MG/DL (ref 0.6–1.3)
EOSINOPHIL # BLD AUTO: 0.18 THOUSAND/ΜL (ref 0–0.61)
EOSINOPHIL NFR BLD AUTO: 3 % (ref 0–6)
ERYTHROCYTE [DISTWIDTH] IN BLOOD BY AUTOMATED COUNT: 19.1 % (ref 11.6–15.1)
GFR SERPL CREATININE-BSD FRML MDRD: 119 ML/MIN/1.73SQ M
GLUCOSE P FAST SERPL-MCNC: 87 MG/DL (ref 65–99)
GLUCOSE SERPL-MCNC: 87 MG/DL (ref 65–140)
HCT VFR BLD AUTO: 35.3 % (ref 34.8–46.1)
HGB BLD-MCNC: 10.8 G/DL (ref 11.5–15.4)
IMM GRANULOCYTES # BLD AUTO: 0.01 THOUSAND/UL (ref 0–0.2)
IMM GRANULOCYTES NFR BLD AUTO: 0 % (ref 0–2)
LYMPHOCYTES # BLD AUTO: 1.71 THOUSANDS/ΜL (ref 0.6–4.47)
LYMPHOCYTES NFR BLD AUTO: 30 % (ref 14–44)
MAGNESIUM SERPL-MCNC: 1.9 MG/DL (ref 1.6–2.6)
MCH RBC QN AUTO: 28.5 PG (ref 26.8–34.3)
MCHC RBC AUTO-ENTMCNC: 30.6 G/DL (ref 31.4–37.4)
MCV RBC AUTO: 93 FL (ref 82–98)
MONOCYTES # BLD AUTO: 0.54 THOUSAND/ΜL (ref 0.17–1.22)
MONOCYTES NFR BLD AUTO: 10 % (ref 4–12)
NEUTROPHILS # BLD AUTO: 3.22 THOUSANDS/ΜL (ref 1.85–7.62)
NEUTS SEG NFR BLD AUTO: 56 % (ref 43–75)
NRBC BLD AUTO-RTO: 0 /100 WBCS
PHOSPHATE SERPL-MCNC: 3 MG/DL (ref 2.7–4.5)
PLATELET # BLD AUTO: 267 THOUSANDS/UL (ref 149–390)
PMV BLD AUTO: 10.1 FL (ref 8.9–12.7)
POTASSIUM SERPL-SCNC: 3.8 MMOL/L (ref 3.5–5.3)
PROT SERPL-MCNC: 7 G/DL (ref 6.4–8.2)
RBC # BLD AUTO: 3.79 MILLION/UL (ref 3.81–5.12)
SODIUM SERPL-SCNC: 136 MMOL/L (ref 136–145)
WBC # BLD AUTO: 5.69 THOUSAND/UL (ref 4.31–10.16)

## 2021-04-03 PROCEDURE — 84100 ASSAY OF PHOSPHORUS: CPT | Performed by: STUDENT IN AN ORGANIZED HEALTH CARE EDUCATION/TRAINING PROGRAM

## 2021-04-03 PROCEDURE — 85025 COMPLETE CBC W/AUTO DIFF WBC: CPT | Performed by: STUDENT IN AN ORGANIZED HEALTH CARE EDUCATION/TRAINING PROGRAM

## 2021-04-03 PROCEDURE — 99219 PR INITIAL OBSERVATION CARE/DAY 50 MINUTES: CPT | Performed by: STUDENT IN AN ORGANIZED HEALTH CARE EDUCATION/TRAINING PROGRAM

## 2021-04-03 PROCEDURE — 80053 COMPREHEN METABOLIC PANEL: CPT | Performed by: STUDENT IN AN ORGANIZED HEALTH CARE EDUCATION/TRAINING PROGRAM

## 2021-04-03 PROCEDURE — 83735 ASSAY OF MAGNESIUM: CPT | Performed by: STUDENT IN AN ORGANIZED HEALTH CARE EDUCATION/TRAINING PROGRAM

## 2021-04-03 PROCEDURE — 36415 COLL VENOUS BLD VENIPUNCTURE: CPT | Performed by: STUDENT IN AN ORGANIZED HEALTH CARE EDUCATION/TRAINING PROGRAM

## 2021-04-03 RX ORDER — DOXYCYCLINE HYCLATE 50 MG/1
324 CAPSULE, GELATIN COATED ORAL 2 TIMES DAILY
COMMUNITY

## 2021-04-03 RX ORDER — SODIUM CHLORIDE 9 MG/ML
125 INJECTION, SOLUTION INTRAVENOUS CONTINUOUS
Status: DISCONTINUED | OUTPATIENT
Start: 2021-04-03 | End: 2021-04-03 | Stop reason: HOSPADM

## 2021-04-03 RX ORDER — CYCLOBENZAPRINE HCL 5 MG
5 TABLET ORAL 3 TIMES DAILY PRN
Qty: 9 TABLET | Refills: 0 | Status: SHIPPED | OUTPATIENT
Start: 2021-04-03 | End: 2021-04-06

## 2021-04-03 RX ADMIN — SODIUM CHLORIDE 125 ML/HR: 0.9 INJECTION, SOLUTION INTRAVENOUS at 01:01

## 2021-04-03 NOTE — ED PROVIDER NOTES
History  Chief Complaint   Patient presents with    Abdominal Pain     Pt reports right sided abd pain all day  Denies NVD  47-year-old otherwise healthy female who presents to the emergency department accompanied by male partner at bedside for complaint of right flank and back pain starting approximately 2PM today  Patient denies experiencing this pain before  She describes the pain as sharp, localizes pain to the right flank, radiating around to the right upper quadrant, constant, did not take any medications for pain, worse with movement  There is no history of abdominal surgery  She denies any associated fever or chills, nausea or vomiting, constipation or diarrhea, urinary symptoms, abdominal distension, rash or skin color change  Prior to Admission Medications   Prescriptions Last Dose Informant Patient Reported? Taking? COLLAGEN PO Past Week at Unknown time  Yes Yes   Sig: Take 11 g by mouth daily   Cholecalciferol (VITAMIN D3 PO) Past Week at Unknown time Self Yes Yes   Sig: Take 5,000 Units by mouth daily    cyclobenzaprine (FLEXERIL) 5 mg tablet Past Month at Unknown time Self Yes Yes   Sig: Take 5 mg by mouth 2 (two) times a day as needed for muscle spasms    ferrous gluconate (FERGON) 324 mg tablet Past Week at Unknown time Self Yes Yes   Sig: Take 324 mg by mouth 2 (two) times a day   meloxicam (MOBIC) 15 mg tablet Past Month at Unknown time Self Yes Yes   Sig: Take 15 mg by mouth daily      Facility-Administered Medications: None       Past Medical History:   Diagnosis Date    Asthma        Past Surgical History:   Procedure Laterality Date    BREAST BIOPSY         History reviewed  No pertinent family history  I have reviewed and agree with the history as documented      E-Cigarette/Vaping     E-Cigarette/Vaping Substances     Social History     Tobacco Use    Smoking status: Never Smoker   Substance Use Topics    Alcohol use: Not Currently    Drug use: Never       Review of Systems   Constitutional: Negative for appetite change, chills, fatigue, fever and unexpected weight change  Respiratory: Negative for cough, chest tightness and shortness of breath  Cardiovascular: Negative for chest pain and palpitations  Gastrointestinal: Positive for abdominal pain  Negative for abdominal distention, constipation, diarrhea, nausea and vomiting  Genitourinary: Positive for flank pain  Negative for decreased urine volume, difficulty urinating, dysuria, frequency, hematuria and urgency  Musculoskeletal: Positive for back pain  Negative for myalgias  Skin: Negative for color change and rash  Neurological: Negative for dizziness, weakness, light-headedness and headaches  Hematological: Negative for adenopathy  Physical Exam  Physical Exam  Vitals signs reviewed  Constitutional:       General: She is awake  She is not in acute distress  Appearance: Normal appearance  She is well-developed  She is not ill-appearing or toxic-appearing  HENT:      Head: Normocephalic and atraumatic  Mouth/Throat:      Lips: Pink  Mouth: Mucous membranes are moist       Pharynx: Oropharynx is clear  Uvula midline  Eyes:      Extraocular Movements: Extraocular movements intact  Conjunctiva/sclera: Conjunctivae normal       Pupils: Pupils are equal, round, and reactive to light  Neck:      Musculoskeletal: Normal range of motion and neck supple  Cardiovascular:      Rate and Rhythm: Normal rate and regular rhythm  Pulses: Normal pulses  Pulmonary:      Effort: Pulmonary effort is normal       Breath sounds: Normal breath sounds  Abdominal:      General: Bowel sounds are normal  There is no distension  Palpations: Abdomen is soft  There is no hepatomegaly, splenomegaly or mass  Tenderness: There is abdominal tenderness in the right upper quadrant  There is right CVA tenderness  There is no left CVA tenderness, guarding or rebound        Hernia: No hernia is present  Musculoskeletal: Normal range of motion  Skin:     General: Skin is warm  Capillary Refill: Capillary refill takes less than 2 seconds  Findings: No erythema, lesion or rash  Neurological:      Mental Status: She is alert and oriented to person, place, and time           Vital Signs  ED Triage Vitals   Temperature Pulse Respirations Blood Pressure SpO2   04/02/21 2111 04/02/21 2111 04/02/21 2111 04/02/21 2111 04/02/21 2111   98 1 °F (36 7 °C) 86 18 110/72 100 %      Temp Source Heart Rate Source Patient Position - Orthostatic VS BP Location FiO2 (%)   04/02/21 2111 04/02/21 2111 04/02/21 2111 04/02/21 2111 --   Temporal Monitor Sitting Left arm       Pain Score       04/02/21 2231       8           Vitals:    04/03/21 0111 04/03/21 0600 04/03/21 0755 04/03/21 1104   BP: 142/93 132/73 126/86 139/80   Pulse: 74 80 74 71   Patient Position - Orthostatic VS: Sitting Lying Lying Sitting         Visual Acuity      ED Medications  Medications   ketorolac (TORADOL) injection 30 mg (30 mg Intravenous Given 4/2/21 2231)   iohexol (OMNIPAQUE) 350 MG/ML injection (SINGLE-DOSE) 100 mL (100 mL Intravenous Given 4/2/21 2317)       Diagnostic Studies  Results Reviewed     Procedure Component Value Units Date/Time    Comprehensive metabolic panel [[id number]]  (Abnormal) Collected: 04/03/21 0600    Lab Status: Final result Specimen: Blood from Arm, Right Updated: 04/03/21 0637     Sodium 136 mmol/L      Potassium 3 8 mmol/L      Chloride 105 mmol/L      CO2 25 mmol/L      ANION GAP 6 mmol/L      BUN 13 mg/dL      Creatinine 0 72 mg/dL      Glucose 87 mg/dL      Glucose, Fasting 87 mg/dL      Calcium 7 7 mg/dL      Corrected Calcium 8 5 mg/dL      AST 14 U/L      ALT 15 U/L      Alkaline Phosphatase 75 U/L      Total Protein 7 0 g/dL      Albumin 3 0 g/dL      Total Bilirubin 0 33 mg/dL      eGFR 119 ml/min/1 73sq m     Narrative:      [place] guidelines for Chronic Kidney Disease (CKD):      Stage 1 with normal or high GFR (GFR > 90 mL/min/1 73 square meters)    Stage 2 Mild CKD (GFR = 60-89 mL/min/1 73 square meters)    Stage 3A Moderate CKD (GFR = 45-59 mL/min/1 73 square meters)    Stage 3B Moderate CKD (GFR = 30-44 mL/min/1 73 square meters)    Stage 4 Severe CKD (GFR = 15-29 mL/min/1 73 square meters)    Stage 5 End Stage CKD (GFR <15 mL/min/1 73 square meters)  Note: GFR calculation is accurate only with a steady state creatinine    Magnesium [[id number]]  (Normal) Collected: 04/03/21 0600    Lab Status: Final result Specimen: Blood from Arm, Right Updated: 04/03/21 0637     Magnesium 1 9 mg/dL     Phosphorus [[id number]]  (Normal) Collected: 04/03/21 0600    Lab Status: Final result Specimen: Blood from Arm, Right Updated: 04/03/21 0637     Phosphorus 3 0 mg/dL     CBC and differential [[id number]]  (Abnormal) Collected: 04/03/21 0600    Lab Status: Final result Specimen: Blood from Arm, Right Updated: 04/03/21 0613     WBC 5 69 Thousand/uL      RBC 3 79 Million/uL      Hemoglobin 10 8 g/dL      Hematocrit 35 3 %      MCV 93 fL      MCH 28 5 pg      MCHC 30 6 g/dL      RDW 19 1 %      MPV 10 1 fL      Platelets 201 Thousands/uL      nRBC 0 /100 WBCs      Neutrophils Relative 56 %      Immat GRANS % 0 %      Lymphocytes Relative 30 %      Monocytes Relative 10 %      Eosinophils Relative 3 %      Basophils Relative 1 %      Neutrophils Absolute 3 22 Thousands/µL      Immature Grans Absolute 0 01 Thousand/uL      Lymphocytes Absolute 1 71 Thousands/µL      Monocytes Absolute 0 54 Thousand/µL      Eosinophils Absolute 0 18 Thousand/µL      Basophils Absolute 0 03 Thousands/µL     UA w Reflex to Microscopic w Reflex to Culture [[id number]]  (Abnormal) Collected: 04/02/21 2231    Lab Status: Final result Specimen: Urine, Clean Catch Updated: 04/02/21 2241     Color, UA Yellow     Clarity, UA Clear     Specific Gravity, UA 1 015     pH, UA 7 0     Leukocytes, UA Negative     Nitrite, UA Negative     Protein, UA Negative mg/dl      Glucose, UA Negative mg/dl      Ketones, UA Trace mg/dl      Urobilinogen, UA 1 0 E U /dl      Bilirubin, UA Negative     Blood, UA Negative    POCT pregnancy, urine [[id number]]  (Normal) Resulted: 04/02/21 2230    Lab Status: Final result Updated: 04/02/21 2231     EXT PREG TEST UR (Ref: Negative) neg     Control sera    Lipase [[id number]]  (Normal) Collected: 04/02/21 2115    Lab Status: Final result Specimen: Blood from Arm, Left Updated: 04/02/21 2141     Lipase 73 u/L     Comprehensive metabolic panel [[id number]]  (Abnormal) Collected: 04/02/21 2115    Lab Status: Final result Specimen: Blood from Arm, Left Updated: 04/02/21 2139     Sodium 136 mmol/L      Potassium 4 4 mmol/L      Chloride 103 mmol/L      CO2 28 mmol/L      ANION GAP 5 mmol/L      BUN 12 mg/dL      Creatinine 0 82 mg/dL      Glucose 90 mg/dL      Calcium 7 8 mg/dL      Corrected Calcium 8 3 mg/dL      AST 13 U/L      ALT 14 U/L      Alkaline Phosphatase 87 U/L      Total Protein 7 8 g/dL      Albumin 3 4 g/dL      Total Bilirubin 0 29 mg/dL      eGFR 101 ml/min/1 73sq m     Narrative:      [place] guidelines for Chronic Kidney Disease (CKD):     Stage 1 with normal or high GFR (GFR > 90 mL/min/1 73 square meters)    Stage 2 Mild CKD (GFR = 60-89 mL/min/1 73 square meters)    Stage 3A Moderate CKD (GFR = 45-59 mL/min/1 73 square meters)    Stage 3B Moderate CKD (GFR = 30-44 mL/min/1 73 square meters)    Stage 4 Severe CKD (GFR = 15-29 mL/min/1 73 square meters)    Stage 5 End Stage CKD (GFR <15 mL/min/1 73 square meters)  Note: GFR calculation is accurate only with a steady state creatinine    CBC and differential [[id number]]  (Abnormal) Collected: 04/02/21 2115    Lab Status: Final result Specimen: Blood from Arm, Left Updated: 04/02/21 2121     WBC 6 48 Thousand/uL      RBC 4 11 Million/uL      Hemoglobin 11 8 g/dL      Hematocrit 38 5 %      MCV 94 fL      MCH 28 7 pg      MCHC 30 6 g/dL      RDW 19 0 %      MPV 10 0 fL      Platelets 874 Thousands/uL      nRBC 0 /100 WBCs      Neutrophils Relative 59 %      Immat GRANS % 0 %      Lymphocytes Relative 27 %      Monocytes Relative 10 %      Eosinophils Relative 3 %      Basophils Relative 1 %      Neutrophils Absolute 3 86 Thousands/µL      Immature Grans Absolute 0 02 Thousand/uL      Lymphocytes Absolute 1 77 Thousands/µL      Monocytes Absolute 0 62 Thousand/µL      Eosinophils Absolute 0 16 Thousand/µL      Basophils Absolute 0 05 Thousands/µL                  CT abdomen pelvis with contrast   Final Result by Isis Roberts DO (04/03 0020)      No nephrolithiasis or hydronephrosis  The appendix is prominent measuring 8 mm in diameter, nonspecific but acute appendicitis considered in the appropriate clinical setting  Correlation with patient's symptoms and laboratory values recommended  Enlarged fibroid uterus, largest fibroid in the posterior uterine fundus measures approximately 7 3 cm in size  Colonic diverticulosis without evidence of acute diverticulitis  Other findings as above  Workstation performed: [id number]                    Procedures  Procedures         ED Course  ED Course as of Apr 08 0342   Sat Apr 03, 2021   0033 On reassessment, patient reports pain is unchanged  Gen surg consulted due to CT findings of enlarged appendix and no pain improvement  9697 Per gen surg, will admit for obs overnight                                SBIRT 20yo+      Most Recent Value   SBIRT (24 yo +)   In order to provide better care to our patients, we are screening all of our patients for alcohol and drug use  Would it be okay to ask you these screening questions? Unable to answer at this time Filed at: 04/02/2021 2205                    MDM  Number of Diagnoses or Management Options  Right flank pain:   Right upper quadrant abdominal pain:   Diagnosis management comments:  On exam, well-appearing female, no acute distress, nontoxic appearance, afebrile, vitals unremarkable, awake alert oriented, abdomen soft, + tenderness and right upper quadrant, + right CVA tenderness, no distension, no hepatosplenomegaly, no palpable mass, no rash or skin color change, remainder of exam unremarkable as above  Consider GB disease, UTI, kidney stone, MSK strain/spasm, appendicitis       Amount and/or Complexity of Data Reviewed  Clinical lab tests: reviewed and ordered  Tests in the radiology section of CPT®: ordered and reviewed  Discussion of test results with the performing providers: yes  Decide to obtain previous medical records or to obtain history from someone other than the patient: yes  Obtain history from someone other than the patient: yes  Review and summarize past medical records: yes  Discuss the patient with other providers: yes  Independent visualization of images, tracings, or specimens: yes    Patient Progress  Patient progress: stable (See ED course note for dispo and plan  I reviewed and discussed all lab and imaging findings with the patient at bedside  I answered any and all questions the patient had regarding emergency department course of evaluation and treatment  The patient verbalized understanding of and agreement with plan   )      Disposition  Final diagnoses:   Right upper quadrant abdominal pain   Right flank pain     Time reflects when diagnosis was documented in both MDM as applicable and the Disposition within this note     Time User Action Codes Description Comment    4/3/2021  5:36 AM Nenita Davis Add [R10 11] Right upper quadrant abdominal pain     4/3/2021  5:36 AM Nenita Davis Add [R10 9] Right flank pain       ED Disposition     ED Disposition Condition Date/Time Comment    Discharge  Sat Apr 3, 2021 12:23 PM Dashawn Ku discharge to home/self care            Follow-up Information    None         Discharge Medication List as of 4/3/2021 11:48 AM START taking these medications    Details   !! cyclobenzaprine (FLEXERIL) 5 mg tablet Take 1 tablet (5 mg total) by mouth 3 (three) times a day as needed for muscle spasms for up to 3 days, Starting Sat 4/3/2021, Until Tue 4/6/2021, Normal       !! - Potential duplicate medications found  Please discuss with provider  CONTINUE these medications which have NOT CHANGED    Details   Cholecalciferol (VITAMIN D3 PO) Take 5,000 Units by mouth daily , Historical Med      COLLAGEN PO Take 11 g by mouth daily, Historical Med      !! cyclobenzaprine (FLEXERIL) 5 mg tablet Take 5 mg by mouth 2 (two) times a day as needed for muscle spasms , Historical Med      ferrous gluconate (FERGON) 324 mg tablet Take 324 mg by mouth 2 (two) times a day, Historical Med      meloxicam (MOBIC) 15 mg tablet Take 15 mg by mouth daily, Historical Med       !! - Potential duplicate medications found  Please discuss with provider          Outpatient Discharge Orders   Discharge Diet     Activity as tolerated     Call provider for:  persistent nausea or vomiting     Call provider for:  severe uncontrolled pain     Call provider for:  redness, tenderness, or signs of infection (pain, swelling, redness, odor or green/yellow discharge around incision site)     Call provider for: active or persistent bleeding     Call provider for:  difficulty breathing, headache or visual disturbances     Follow-up primary physician     No follow-up with surgeon necessary       [address] Review     None          ED Provider  Electronically Signed by           Lorena Fernandez PA-C  04/08/21 8131

## 2021-04-03 NOTE — H&P
H&P Exam - General Surgery   Yareli Mobley 39 y o  female MRN: [id number]  Unit/Bed#: ED 11 Encounter: [id number]    Assessment/Plan     Yareli Mobley is a 39 y o  female    Back pain, r/o appendicitis  CT of abdomen and pelvis showed appendix measuring 8 mm    Low clinical suspicion for acute appendicitis  At this time, patient complained primarily of back pain that radiates to the right upper quadrant with turning  She denies any right lower quadrant pain  No evidence of leukocytosis on admission or this a m  despite being off antibiotics  Patient is also passing flatus notes feeling hungry  Suspect back pain is musculoskeletal in nature  Diet was trialed in the ED which patient was able to tolerate without any further abdominal pain  She was deemed appropriate for discharge home with instructions to return to the ER if she develops any worsening nausea, vomiting, abdominal pain, fevers, chills  She was given a prescription for Flexeril and was instructed to follow-up with her PCP for musculoskeletal pain      History of Present Illness     HPI:  Yareli Mobley is a 39 y o  female who presents with right back pain radiating to upper abdomen  Patient states pain started spontaneously yesterday and was more severe than typical back pain she experiences which prompted her to come to the ED  CT of the abdomen and pelvis showed an appendix measuring 8 mm in diameter, nonspecific  Given findings on CT, patient was admitted for observation overnight  Following a m , patient was noted to be doing well with improved back pain  She denies any further abdominal pain unless she twists  She denies any nausea or vomiting  She denies any right lower quadrant pain  She is still passing flatus  Review of Systems   Constitutional: Negative for activity change, appetite change, fatigue, fever and unexpected weight change  HENT: Negative for congestion, tinnitus and voice change      Eyes: Negative for photophobia, discharge and visual disturbance  Respiratory: Negative for apnea, chest tightness, shortness of breath, wheezing and stridor  Cardiovascular: Negative for chest pain and leg swelling  Gastrointestinal: Negative for abdominal distention, abdominal pain, constipation, nausea and rectal pain  Endocrine: Negative for cold intolerance, polydipsia, polyphagia and polyuria  Genitourinary: Negative for decreased urine volume, difficulty urinating, dysuria, flank pain, hematuria, pelvic pain and urgency  Musculoskeletal: Positive for back pain  Negative for neck pain and neck stiffness  Skin: Negative for color change, pallor, rash and wound  Neurological: Negative for dizziness, tremors, syncope and weakness  Historical Information   Past Medical History:   Diagnosis Date    Asthma      Past Surgical History:   Procedure Laterality Date    BREAST BIOPSY       Social History   Social History     Substance and Sexual Activity   Alcohol Use Not Currently     Social History     Substance and Sexual Activity   Drug Use Never     Social History     Tobacco Use   Smoking Status Never Smoker     Family History: History reviewed  No pertinent family history  Meds/Allergies   PTA meds:   Prior to Admission Medications   Prescriptions Last Dose Informant Patient Reported? Taking?    COLLAGEN PO Past Week at Unknown time  Yes Yes   Sig: Take 11 g by mouth daily   Cholecalciferol (VITAMIN D3 PO) Past Week at Unknown time Self Yes Yes   Sig: Take 5,000 Units by mouth daily    cyclobenzaprine (FLEXERIL) 5 mg tablet Past Month at Unknown time Self Yes Yes   Sig: Take 5 mg by mouth 2 (two) times a day as needed for muscle spasms    ferrous gluconate (FERGON) 324 mg tablet Past Week at Unknown time Self Yes Yes   Sig: Take 324 mg by mouth 2 (two) times a day   meloxicam (MOBIC) 15 mg tablet Past Month at Unknown time Self Yes Yes   Sig: Take 15 mg by mouth daily      Facility-Administered Medications: None Allergies   Allergen Reactions    Penicillins        Objective   First Vitals:   Blood Pressure: 110/72 (04/02/21 2111)  Pulse: 86 (04/02/21 2111)  Temperature: 98 1 °F (36 7 °C) (04/02/21 2111)  Temp Source: Temporal (04/02/21 2111)  Respirations: 18 (04/02/21 2111)  Height: 5' 2" (157 5 cm) (04/02/21 2204)  Weight - Scale: 90 7 kg (200 lb) (04/02/21 2204)  SpO2: 100 % (04/02/21 2111)    Current Vitals:   Blood Pressure: 139/80 (04/03/21 1104)  Pulse: 71 (04/03/21 1104)  Temperature: 98 °F (36 7 °C) (04/03/21 0755)  Temp Source: Oral (04/03/21 0755)  Respirations: 16 (04/03/21 1104)  Height: 5' 2" (157 5 cm) (04/02/21 2204)  Weight - Scale: 90 7 kg (200 lb) (04/02/21 2204)  SpO2: 100 % (04/03/21 1104)      Intake/Output Summary (Last 24 hours) at 4/3/2021 1437  Last data filed at 4/3/2021 1131  Gross per 24 hour   Intake 1000 ml   Output --   Net 1000 ml       Invasive Devices     None                 Physical Exam  Constitutional:       General: She is not in acute distress  Appearance: She is well-developed  She is not diaphoretic  HENT:      Head: Normocephalic and atraumatic  Right Ear: External ear normal       Left Ear: External ear normal       Mouth/Throat:      Pharynx: No oropharyngeal exudate  Eyes:      Conjunctiva/sclera: Conjunctivae normal       Pupils: Pupils are equal, round, and reactive to light  Neck:      Musculoskeletal: Normal range of motion and neck supple  Thyroid: No thyromegaly  Trachea: No tracheal deviation  Cardiovascular:      Rate and Rhythm: Normal rate and regular rhythm  Heart sounds: Normal heart sounds  No murmur  No friction rub  No gallop  Pulmonary:      Effort: Pulmonary effort is normal  No respiratory distress  Breath sounds: Normal breath sounds  No wheezing or rales  Chest:      Chest wall: No tenderness  Abdominal:      General: Bowel sounds are normal  There is no distension  Palpations: Abdomen is soft  Tenderness: There is no abdominal tenderness  There is no guarding or rebound  Comments: Soft, nondistended, active bowel sounds, nontender to palpation   Musculoskeletal: Normal range of motion  General: No tenderness or deformity  Comments: Right CVA tenderness   Skin:     General: Skin is warm and dry  Coloration: Skin is not pale  Findings: No erythema or rash  Neurological:      Mental Status: She is oriented to person, place, and time  Cranial Nerves: No cranial nerve deficit  Coordination: Coordination normal       Deep Tendon Reflexes: Reflexes are normal and symmetric  Lab Results: I have personally reviewed pertinent lab results      Recent Results (from the past 36 hour(s))   CBC and differential    Collection Time: 04/02/21  9:15 PM   Result Value Ref Range    WBC 6 48 4 31 - 10 16 Thousand/uL    RBC 4 11 3 81 - 5 12 Million/uL    Hemoglobin 11 8 11 5 - 15 4 g/dL    Hematocrit 38 5 34 8 - 46 1 %    MCV 94 82 - 98 fL    MCH 28 7 26 8 - 34 3 pg    MCHC 30 6 (L) 31 4 - 37 4 g/dL    RDW 19 0 (H) 11 6 - 15 1 %    MPV 10 0 8 9 - 12 7 fL    Platelets 327 851 - 120 Thousands/uL    nRBC 0 /100 WBCs    Neutrophils Relative 59 43 - 75 %    Immat GRANS % 0 0 - 2 %    Lymphocytes Relative 27 14 - 44 %    Monocytes Relative 10 4 - 12 %    Eosinophils Relative 3 0 - 6 %    Basophils Relative 1 0 - 1 %    Neutrophils Absolute 3 86 1 85 - 7 62 Thousands/µL    Immature Grans Absolute 0 02 0 00 - 0 20 Thousand/uL    Lymphocytes Absolute 1 77 0 60 - 4 47 Thousands/µL    Monocytes Absolute 0 62 0 17 - 1 22 Thousand/µL    Eosinophils Absolute 0 16 0 00 - 0 61 Thousand/µL    Basophils Absolute 0 05 0 00 - 0 10 Thousands/µL   Comprehensive metabolic panel    Collection Time: 04/02/21  9:15 PM   Result Value Ref Range    Sodium 136 136 - 145 mmol/L    Potassium 4 4 3 5 - 5 3 mmol/L    Chloride 103 100 - 108 mmol/L    CO2 28 21 - 32 mmol/L    ANION GAP 5 4 - 13 mmol/L    BUN 12 5 - 25 mg/dL    Creatinine 0 82 0 60 - 1 30 mg/dL    Glucose 90 65 - 140 mg/dL    Calcium 7 8 (L) 8 3 - 10 1 mg/dL    Corrected Calcium 8 3 8 3 - 10 1 mg/dL    AST 13 5 - 45 U/L    ALT 14 12 - 78 U/L    Alkaline Phosphatase 87 46 - 116 U/L    Total Protein 7 8 6 4 - 8 2 g/dL    Albumin 3 4 (L) 3 5 - 5 0 g/dL    Total Bilirubin 0 29 0 20 - 1 00 mg/dL    eGFR 101 ml/min/1 73sq m   Lipase    Collection Time: 04/02/21  9:15 PM   Result Value Ref Range    Lipase 73 73 - 393 u/L   POCT pregnancy, urine    Collection Time: 04/02/21 10:30 PM   Result Value Ref Range    EXT PREG TEST UR (Ref: Negative) neg     Control sera    UA w Reflex to Microscopic w Reflex to Culture    Collection Time: 04/02/21 10:31 PM    Specimen: Urine, Clean Catch   Result Value Ref Range    Color, UA Yellow     Clarity, UA Clear     Specific [place], UA 1 015 1 003 - 1 030    pH, UA 7 0 4 5, 5 0, 5 5, 6 0, 6 5, 7 0, 7 5, 8 0    Leukocytes, UA Negative Negative    Nitrite, UA Negative Negative    Protein, UA Negative Negative mg/dl    Glucose, UA Negative Negative mg/dl    Ketones, UA Trace (A) Negative mg/dl    Urobilinogen, UA 1 0 0 2, 1 0 E U /dl E U /dl    Bilirubin, UA Negative Negative    Blood, UA Negative Negative   Comprehensive metabolic panel    Collection Time: 04/03/21  6:00 AM   Result Value Ref Range    Sodium 136 136 - 145 mmol/L    Potassium 3 8 3 5 - 5 3 mmol/L    Chloride 105 100 - 108 mmol/L    CO2 25 21 - 32 mmol/L    ANION GAP 6 4 - 13 mmol/L    BUN 13 5 - 25 mg/dL    Creatinine 0 72 0 60 - 1 30 mg/dL    Glucose 87 65 - 140 mg/dL    Glucose, Fasting 87 65 - 99 mg/dL    Calcium 7 7 (L) 8 3 - 10 1 mg/dL    Corrected Calcium 8 5 8 3 - 10 1 mg/dL    AST 14 5 - 45 U/L    ALT 15 12 - 78 U/L    Alkaline Phosphatase 75 46 - 116 U/L    Total Protein 7 0 6 4 - 8 2 g/dL    Albumin 3 0 (L) 3 5 - 5 0 g/dL    Total Bilirubin 0 33 0 20 - 1 00 mg/dL    eGFR 119 ml/min/1 73sq m   Magnesium    Collection Time: 04/03/21  6:00 AM   Result Value Ref Range    Magnesium 1 9 1 6 - 2 6 mg/dL   Phosphorus    Collection Time: 04/03/21  6:00 AM   Result Value Ref Range    Phosphorus 3 0 2 7 - 4 5 mg/dL   CBC and differential    Collection Time: 04/03/21  6:00 AM   Result Value Ref Range    WBC 5 69 4 31 - 10 16 Thousand/uL    RBC 3 79 (L) 3 81 - 5 12 Million/uL    Hemoglobin 10 8 (L) 11 5 - 15 4 g/dL    Hematocrit 35 3 34 8 - 46 1 %    MCV 93 82 - 98 fL    MCH 28 5 26 8 - 34 3 pg    MCHC 30 6 (L) 31 4 - 37 4 g/dL    RDW 19 1 (H) 11 6 - 15 1 %    MPV 10 1 8 9 - 12 7 fL    Platelets 938 984 - 339 Thousands/uL    nRBC 0 /100 WBCs    Neutrophils Relative 56 43 - 75 %    Immat GRANS % 0 0 - 2 %    Lymphocytes Relative 30 14 - 44 %    Monocytes Relative 10 4 - 12 %    Eosinophils Relative 3 0 - 6 %    Basophils Relative 1 0 - 1 %    Neutrophils Absolute 3 22 1 85 - 7 62 Thousands/µL    Immature Grans Absolute 0 01 0 00 - 0 20 Thousand/uL    Lymphocytes Absolute 1 71 0 60 - 4 47 Thousands/µL    Monocytes Absolute 0 54 0 17 - 1 22 Thousand/µL    Eosinophils Absolute 0 18 0 00 - 0 61 Thousand/µL    Basophils Absolute 0 03 0 00 - 0 10 Thousands/µL     Imaging: I have personally reviewed pertinent reports  Ct Abdomen Pelvis With Contrast    Result Date: 4/3/2021  Impression: No nephrolithiasis or hydronephrosis  The appendix is prominent measuring 8 mm in diameter, nonspecific but acute appendicitis considered in the appropriate clinical setting  Correlation with patient's symptoms and laboratory values recommended  Enlarged fibroid uterus, largest fibroid in the posterior uterine fundus measures approximately 7 3 cm in size  Colonic diverticulosis without evidence of acute diverticulitis  Other findings as above  Workstation performed: [id number]        EKG, Pathology, and Other Studies: I have personally reviewed pertinent reports

## 2021-04-03 NOTE — PLAN OF CARE
Problem: PAIN - ADULT  Goal: Verbalizes/displays adequate comfort level or baseline comfort level  Description: Interventions:  - Encourage patient to monitor pain and request assistance  - Assess pain using appropriate pain scale  - Administer analgesics based on type and severity of pain and evaluate response  - Implement non-pharmacological measures as appropriate and evaluate response  - Consider cultural and social influences on pain and pain management  - Notify physician/advanced practitioner if interventions unsuccessful or patient reports new pain  Outcome: Progressing     Problem: INFECTION - ADULT  Goal: Absence or prevention of progression during hospitalization  Description: INTERVENTIONS:  - Assess and monitor for signs and symptoms of infection  - Monitor lab/diagnostic results  - Monitor all insertion sites, i e  indwelling lines, tubes, and drains  - Monitor endotracheal if appropriate and nasal secretions for changes in amount and color  - [place] appropriate cooling/warming therapies per order  - Administer medications as ordered  - Instruct and encourage patient and family to use good hand hygiene technique  - Identify and instruct in appropriate isolation precautions for identified infection/condition  Outcome: Progressing  Goal: Absence of fever/infection during neutropenic period  Description: INTERVENTIONS:  - Monitor WBC    Outcome: Progressing     Problem: SAFETY ADULT  Goal: Patient will remain free of falls  Description: INTERVENTIONS:  - Assess patient frequently for physical needs  -  Identify cognitive and physical deficits and behaviors that affect risk of falls    -  [place] fall precautions as indicated by assessment   - Educate patient/family on patient safety including physical limitations  - Instruct patient to call for assistance with activity based on assessment  - Modify environment to reduce risk of injury  - Consider OT/PT consult to assist with strengthening/mobility  Outcome: Progressing  Goal: Maintain or return to baseline ADL function  Description: INTERVENTIONS:  -  Assess patient's ability to carry out ADLs; assess patient's baseline for ADL function and identify physical deficits which impact ability to perform ADLs (bathing, care of mouth/teeth, toileting, grooming, dressing, etc )  - Assess/evaluate cause of self-care deficits   - Assess range of motion  - Assess patient's mobility; develop plan if impaired  - Assess patient's need for assistive devices and provide as appropriate  - Encourage maximum independence but intervene and supervise when necessary  - Involve family in performance of ADLs  - Assess for home care needs following discharge   - Consider OT consult to assist with ADL evaluation and planning for discharge  - Provide patient education as appropriate  Outcome: Progressing  Goal: Maintain or return mobility status to optimal level  Description: INTERVENTIONS:  - Assess patient's baseline mobility status (ambulation, transfers, stairs, etc )    - Identify cognitive and physical deficits and behaviors that affect mobility  - Identify mobility aids required to assist with transfers and/or ambulation (gait belt, sit-to-stand, lift, walker, cane, etc )  - [place] fall precautions as indicated by assessment  - Record patient progress and toleration of activity level on Mobility SBAR; progress patient to next Phase/Stage  - Instruct patient to call for assistance with activity based on assessment  - Consider rehabilitation consult to assist with strengthening/weightbearing, etc   Outcome: Progressing     Problem: DISCHARGE PLANNING  Goal: Discharge to home or other facility with appropriate resources  Description: INTERVENTIONS:  - Identify barriers to discharge w/patient and caregiver  - Arrange for needed discharge resources and transportation as appropriate  - Identify discharge learning needs (meds, wound care, etc )  - Arrange for interpretive services to assist at discharge as needed  - Refer to Case Management Department for coordinating discharge planning if the patient needs post-hospital services based on physician/advanced practitioner order or complex needs related to functional status, cognitive ability, or social support system  Outcome: Progressing     Problem: Knowledge Deficit  Goal: Patient/family/caregiver demonstrates understanding of disease process, treatment plan, medications, and discharge instructions  Description: Complete learning assessment and assess knowledge base    Interventions:  - Provide teaching at level of understanding  - Provide teaching via preferred learning methods  Outcome: Progressing     Problem: GASTROINTESTINAL - ADULT  Goal: Minimal or absence of nausea and/or vomiting  Description: INTERVENTIONS:  - Administer IV fluids if ordered to ensure adequate hydration  - Maintain NPO status until nausea and vomiting are resolved  - Nasogastric tube if ordered  - Administer ordered antiemetic medications as needed  - Provide nonpharmacologic comfort measures as appropriate  - Advance diet as tolerated, if ordered  - Consider nutrition services referral to assist patient with adequate nutrition and appropriate food choices  Outcome: Progressing  Goal: Maintains or returns to baseline bowel function  Description: INTERVENTIONS:  - Assess bowel function  - Encourage oral fluids to ensure adequate hydration  - Administer IV fluids if ordered to ensure adequate hydration  - Administer ordered medications as needed  - Encourage mobilization and activity  - Consider nutritional services referral to assist patient with adequate nutrition and appropriate food choices  Outcome: Progressing  Goal: Maintains adequate nutritional intake  Description: INTERVENTIONS:  - Monitor percentage of each meal consumed  - Identify factors contributing to decreased intake, treat as appropriate  - Assist with meals as needed  - Monitor I&O, weight, and lab values if indicated  - Obtain nutrition services referral as needed  Outcome: Progressing  Goal: Establish and maintain optimal ostomy function  Description: INTERVENTIONS:  - Assess bowel function  - Encourage oral fluids to ensure adequate hydration  - Administer IV fluids if ordered to ensure adequate hydration   - Administer ordered medications as needed  - Encourage mobilization and activity  - Nutrition services referral to assist patient with appropriate food choices  - Assess stoma site  - Consider wound care consult   Outcome: Progressing

## 2021-04-03 NOTE — UTILIZATION REVIEW
Initial Clinical Review    Admission: Date/Time/Statement:   Admission Orders (From admission, onward)     Ordered        04/03/21 0053  Place in Observation  Once                   Orders Placed This Encounter   Procedures    Place in Observation     Standing Status:   Standing     Number of Occurrences:   1     Order Specific Question:   Level of Care     Answer:   Med Surg [16]     ED Arrival Information     Expected Arrival Acuity Means of Arrival Escorted By Service Admission Type    - 4/2/2021 20:59 Urgent Walk-In Spouse Surgery-General Urgent    Arrival Complaint    Back & Abdominal Pain        Chief Complaint   Patient presents with    Abdominal Pain     Pt reports right sided abd pain all day  Denies NVD  Assessment/Plan:   38y Female to ED presents     ED Triage Vitals   Temperature Pulse Respirations Blood Pressure SpO2   04/02/21 2111 04/02/21 2111 04/02/21 2111 04/02/21 2111 04/02/21 2111   98 1 °F (36 7 °C) 86 18 110/72 100 %      Temp Source Heart Rate Source Patient Position - Orthostatic VS BP Location FiO2 (%)   04/02/21 2111 04/02/21 2111 04/02/21 2111 04/02/21 2111 --   Temporal Monitor Sitting Left arm       Pain Score       04/02/21 2231       8          Wt Readings from Last 1 Encounters:   04/02/21 90 7 kg (200 lb)     Additional Vital Signs:   04/03/21 0755  98 °F (36 7 °C)  74  18  126/86  100 %  None (Room air)  Lying   04/03/21 0600  --  80  16  132/73  100 %  None (Room air)       Pertinent Labs/Diagnostic Test Results:   4/2 CT Abd/Pelvis - No nephrolithiasis or hydronephrosis  The appendix is prominent measuring 8 mm in diameter, nonspecific but acute appendicitis considered in the appropriate clinical setting  Correlation with patient's symptoms and laboratory values recommended    Enlarged fibroid uterus, largest fibroid in the posterior uterine fundus measures approximately 7 3 cm in size  Colonic diverticulosis without evidence of acute diverticulitis        Results from last 7 days   Lab Units 04/03/21  0600 04/02/21  2115   WBC Thousand/uL 5 69 6 48   HEMOGLOBIN g/dL 10 8* 11 8   HEMATOCRIT % 35 3 38 5   PLATELETS Thousands/uL 267 313   NEUTROS ABS Thousands/µL 3 22 3 86         Results from last 7 days   Lab Units 04/03/21  0600 04/02/21  2115   SODIUM mmol/L 136 136   POTASSIUM mmol/L 3 8 4 4   CHLORIDE mmol/L 105 103   CO2 mmol/L 25 28   ANION GAP mmol/L 6 5   BUN mg/dL 13 12   CREATININE mg/dL 0 72 0 82   EGFR ml/min/1 73sq m 119 101   CALCIUM mg/dL 7 7* 7 8*   MAGNESIUM mg/dL 1 9  --    PHOSPHORUS mg/dL 3 0  --      Results from last 7 days   Lab Units 04/03/21  0600 04/02/21  2115   AST U/L 14 13   ALT U/L 15 14   ALK PHOS U/L 75 87   TOTAL PROTEIN g/dL 7 0 7 8   ALBUMIN g/dL 3 0* 3 4*   TOTAL BILIRUBIN mg/dL 0 33 0 29         Results from last 7 days   Lab Units 04/03/21  0600 04/02/21  2115   GLUCOSE RANDOM mg/dL 87 90       Results from last 7 days   Lab Units 04/02/21  2115   LIPASE u/L 73             Results from last 7 days   Lab Units 04/02/21  2231   CLARITY UA  Clear   COLOR UA  Yellow   SPEC GRAV UA  1 015   PH UA  7 0   GLUCOSE UA mg/dl Negative   KETONES UA mg/dl Trace*   BLOOD UA  Negative   PROTEIN UA mg/dl Negative   NITRITE UA  Negative   BILIRUBIN UA  Negative   UROBILINOGEN UA E U /dl 1 0   LEUKOCYTES UA  Negative     ED Treatment:   Medication Administration from 04/02/2021 2059 to 04/03/2021 1028       Date/Time Order Dose Route Action     04/02/2021 2231 ketorolac (TORADOL) injection 30 mg 30 mg Intravenous Given     04/02/2021 2317 iohexol (OMNIPAQUE) 350 MG/ML injection (SINGLE-DOSE) 100 mL 100 mL Intravenous Given     04/03/2021 0101 sodium chloride 0 9 % infusion 125 mL/hr Intravenous New Bag        Past Medical History:   Diagnosis Date    Asthma      Present on Admission:  **None**      Admitting Diagnosis: Abdominal pain [R10 9]  Age/Sex: 39 y o  female  Admission Orders:  Scheduled Medications:     Continuous IV Infusions:  sodium chloride, 125 mL/hr, Intravenous, Continuous      PRN Meds:       Network Utilization Review Department  ATTENTION: Please call with any questions or concerns to [phone] and carefully listen to the prompts so that you are directed to the right person  All voicemails are confidential   Meryl Grider all requests for admission clinical reviews, approved or denied determinations and any other requests to dedicated fax number below belonging to the campus where the patient is receiving treatment   List of dedicated fax numbers for the Facilities:  [address] DENIALS (Administrative/Medical Necessity) [phone]   [address] (Maternity/NICU/Pediatrics) [phone]   [address] [place] [address] ([facility]) [address] [address] [place] [address] [phone]

## 2021-04-07 NOTE — PROGRESS NOTES
PT Discharge    Today's date: 2021  Patient name: Frieda Macario  : 1975  MRN: [id number]  Referring provider: Charlene Pleitez MD  Dx:   Encounter Diagnosis     ICD-10-CM    1  Left knee pain, unspecified chronicity  M25 562    2  Right knee pain, unspecified chronicity  M25 561        Start Time: 0945  Stop Time: 1045  Total time in clinic (min): 60 minutes    Assessment  Assessment details: Pt presented with c/c of B/L knee pain  DC 21  Pt seen for 5 total sessions of skilled therapy with last visit on 3/8/21  Pt contacted by [facility] [employer] [place] left message to schedule additional appts  No return call received at this point  Pt to be d/c 2* expiration of PT script  No additional objective measurements taken 2* pt not returning for formal d/c  Impairments: abnormal gait, abnormal or restricted ROM, activity intolerance, impaired balance, impaired physical strength, lacks appropriate home exercise program and pain with function  Understanding of Dx/Px/POC: good   Prognosis: good    Goals  Short Term Goals  1  Pt will decrease pain in B/L knees 25-50% in 4 wks   2  Pt will improve knee/LE mm strength to 5/5 t/o in 4 wks   3  Pt will be independent with HEP in 4 wks     Long Term Goals  1  Pt will decrease pain in B/L knees % in 8 wks   2  Pt will report improved dynamic stability with functional activities in 8 wks   3  Pt will return to all previous activities without symptoms in 8 wks     Plan  Referral necessary: No        Subjective Evaluation    History of Present Illness  Mechanism of injury: Pt presents with c/c of B/L knee pain  Reports symptoms began a few months ago with no specific onset of injury noted  Had MRI of B/L knees showing complex medial meniscus tear of the left knee, Baker's cyst in left knee, and moderate joint effusion in B/L knees  No ligament tears B/L knees   Currently reports "pulling" in the right posterior knee when transferring sit to stand, some spasms around the left knee, cramping, and popping in B/L, R>L, which can be painful  Had cortisone injection in left knee last week noting decreased swelling and pain in the left knee  Denies grinding, altered sensation  Currently working from home as a   Currently presents with orders for OPPT  Quality of life: good    Pain  Current pain ratin  At best pain ratin  At worst pain ratin  Location: B/L knees   Quality: cramping and tight (pulling)  Relieving factors: medications and heat  Aggravating factors: stair climbing  Progression: no change      Diagnostic Tests  X-ray: abnormal  MRI studies: abnormal  Treatments  Previous treatment: injection treatment  Current treatment: physical therapy  Patient Goals  Patient goals for therapy: decreased pain, decreased edema, improved balance, increased motion, increased strength, independence with ADLs/IADLs and return to sport/leisure activities          Objective     Static Posture     Knee   Genu valgus  Knee (Left): Recurvatum  Knee (Right): Recurvatum  Tenderness   Left Knee   Tenderness in the medial joint line  No tenderness in the inferior fat pad, inferior patella, ITB, lateral joint line, lateral patella, medial patella, patellar tendon, pes anserinus, popliteal fossa, quadriceps tendon, superior patella and tibial tubercle  Right Knee   Tenderness in the lateral joint line  No tenderness in the inferior fat pad, inferior patella, ITB, lateral patella, medial joint line, medial patella, patellar tendon, pes anserinus, popliteal fossa, quadriceps tendon, superior patella and tibial tubercle       Neurological Testing     Sensation     Knee   Left Knee   Intact: light touch    Right Knee   Intact: light touch     Active Range of Motion   Left Knee   Flexion: 122 degrees with pain  Extension: 5 degrees with pain    Right Knee   Flexion: 120 degrees   Extension: 5 degrees with pain    Additional Active Range of Motion Details  Hip flexibility WNL B/L   Discomfort noted at available end ranges     Mobility   Patellar Mobility:   Left Knee   WFL: superior and inferior  Hypermobile: left medial      Right Knee   WFL: superior and inferior  Hypermobile: medial and lateral   Tibiofemoral Mobility:   Left knee   Tibiofemoral tendons within functional limits include the anterior and posterior  Right knee Hypermobile in the anterior and posterior tibiofemoral tendon(s)  Patellar Static Positioning   Left Knee: WFL  Right Knee: nicole    Strength/Myotome Testing     Left Hip   Planes of Motion   Flexion: 4+  Extension: 5  Abduction: 5  Adduction: 4+  External rotation: 4  Internal rotation: 4    Right Hip   Planes of Motion   Flexion: 4+  Extension: 4+  Abduction: 4+  Adduction: 4+  External rotation: 4  Internal rotation: 4    Left Knee   Flexion: 4+  Extension: 4+  Quadriceps contraction: good    Right Knee   Flexion: 4  Extension: 4  Quadriceps contraction: fair    Additional Strength Details  No pain reported with resisted MMT     Tests     Left Knee   Positive medial Mckay and varus stress test at 30 degrees  Negative anterior drawer, anterior Lachman, lateral Mckay, patellar apprehension, posterior drawer, posterior sag, valgus stress test at 0 degrees, valgus stress test at 30 degrees and varus stress test at 0 degrees  Right Knee   Positive lateral Mckay and varus stress test at 30 degrees  Negative anterior drawer, anterior Lachman, medial Mckay, patellar apprehension, posterior drawer, posterior sag, valgus stress test at 0 degrees, valgus stress test at 30 degrees and varus stress test at 0 degrees  Additional Tests Details  Increased laxity noted with varus stress B/L, no pain     Ambulation     Ambulation: Stairs   Pattern: non-reciprocal  Pattern: reciprocal    Additional Stairs Ambulation Details  Ambulates with left foot up     Quality of Movement During Gait     Knee    Knee (Left): Positive valgus     Knee (Right): Positive valgus  Functional Assessment      Squat    Pain, trunk lean left and sitting toward left side  Forward Step Up 8"   Left Leg  Within functional limits  Right Leg  Pain, increased forward trunk lean and increased contralateral push off

## 2023-06-07 ENCOUNTER — OFFICE VISIT (OUTPATIENT)
Dept: URGENT CARE | Facility: CLINIC | Age: 48
End: 2023-06-07
Payer: COMMERCIAL

## 2023-06-07 VITALS
HEART RATE: 80 BPM | SYSTOLIC BLOOD PRESSURE: 145 MMHG | RESPIRATION RATE: 16 BRPM | DIASTOLIC BLOOD PRESSURE: 106 MMHG | OXYGEN SATURATION: 97 %

## 2023-06-07 DIAGNOSIS — J06.9 ACUTE URI: Primary | ICD-10-CM

## 2023-06-07 PROCEDURE — 99283 EMERGENCY DEPT VISIT LOW MDM: CPT | Performed by: PHYSICIAN ASSISTANT

## 2023-06-07 PROCEDURE — G0382 LEV 3 HOSP TYPE B ED VISIT: HCPCS | Performed by: PHYSICIAN ASSISTANT

## 2023-06-07 RX ORDER — ERGOCALCIFEROL 1.25 MG/1
1 CAPSULE ORAL WEEKLY
COMMUNITY

## 2023-06-07 RX ORDER — METHYLPREDNISOLONE 4 MG/1
TABLET ORAL
Qty: 21 EACH | Refills: 0 | Status: SHIPPED | OUTPATIENT
Start: 2023-06-07

## 2023-06-07 RX ORDER — FLUOXETINE HYDROCHLORIDE 60 MG/1
60 TABLET, FILM COATED ORAL; ORAL DAILY
COMMUNITY
Start: 2023-06-02

## 2023-06-07 RX ORDER — QUETIAPINE FUMARATE 100 MG/1
100 TABLET, FILM COATED ORAL EVERY EVENING
COMMUNITY
Start: 2023-06-02

## 2023-06-07 RX ORDER — AZITHROMYCIN 250 MG/1
TABLET, FILM COATED ORAL
Qty: 6 TABLET | Refills: 0 | Status: SHIPPED | OUTPATIENT
Start: 2023-06-07 | End: 2023-06-11

## 2023-06-07 RX ORDER — LAMOTRIGINE 25 MG/1
25 TABLET ORAL DAILY
COMMUNITY
Start: 2023-06-02

## 2023-06-07 RX ORDER — FERROUS SULFATE 325(65) MG
1 TABLET ORAL
COMMUNITY
Start: 2023-04-05

## 2023-06-07 NOTE — PROGRESS NOTES
[facility]        NAME: Demaris Cooks is a 41 y o  female  : 1975    MRN: [id number]  DATE: 2023  TIME: 1:28 PM    Assessment and Plan   Acute URI [J06 9]  1  Acute URI  azithromycin (ZITHROMAX) 250 mg tablet    methylPREDNISolone 4 MG tablet therapy pack            Patient Instructions       Follow up with PCP in 3-5 days  Proceed to  ER if symptoms worsen  Chief Complaint     Chief Complaint   Patient presents with   • Cold Like Symptoms     Since last Wednesday, worsened Monday, loss of voice         History of Present Illness       Patient is a 40 yo female who presents for evaluation of cough, chest congestion, nasal congestion, sore throat x 1 week  Symptoms have been worsening over the last two days  She has been taking OTC cough/cold medicine without much relief  She denies fevers, SOB, CP, wheezing  Review of Systems   Review of Systems   Constitutional: Negative for fever  HENT: Positive for congestion and sore throat  Respiratory: Positive for cough  Negative for chest tightness, shortness of breath and wheezing  Cardiovascular: Negative for chest pain           Current Medications       Current Outpatient Medications:   •  azithromycin (ZITHROMAX) 250 mg tablet, Take 2 tablets today then 1 tablet daily x 4 days, Disp: 6 tablet, Rfl: 0  •  Cholecalciferol (VITAMIN D3 PO), Take 5,000 Units by mouth daily , Disp: , Rfl:   •  COLLAGEN PO, Take 11 g by mouth daily, Disp: , Rfl:   •  ergocalciferol (ERGOCALCIFEROL) 1 25 MG (78493 UT) capsule, Take 1 capsule by mouth once a week, Disp: , Rfl:   •  FeroSul 325 (65 Fe) MG tablet, Take 1 tablet by mouth daily with breakfast, Disp: , Rfl:   •  FLUoxetine (PROzac) 60 MG TABS, Take 60 mg by mouth daily, Disp: , Rfl:   •  lamoTRIgine (LaMICtal) 25 mg tablet, Take 25 mg by mouth daily, Disp: , Rfl:   •  meloxicam (MOBIC) 15 mg tablet, Take 15 mg by mouth daily, Disp: , Rfl:   •  methylPREDNISolone 4 MG tablet therapy pack, Use as directed on package, Disp: 21 each, Rfl: 0  •  QUEtiapine (SEROquel) 100 mg tablet, Take 100 mg by mouth every evening, Disp: , Rfl:   •  TRIAMCINOLONE ACET-CICLOPIROX EX, Apply topically, Disp: , Rfl:   •  cyclobenzaprine (FLEXERIL) 5 mg tablet, Take 5 mg by mouth 2 (two) times a day as needed for muscle spasms  (Patient not taking: Reported on 6/7/2023), Disp: , Rfl:   •  ferrous gluconate (FERGON) 324 mg tablet, Take 324 mg by mouth 2 (two) times a day (Patient not taking: Reported on 6/7/2023), Disp: , Rfl:     Current Allergies     Allergies as of 06/07/2023 - Reviewed 06/07/2023   Allergen Reaction Noted   • Penicillins  03/21/2020            The following portions of the patient's history were reviewed and updated as appropriate: allergies, current medications, past family history, past medical history, past social history, past surgical history and problem list      Past Medical History:   Diagnosis Date   • Asthma        Past Surgical History:   Procedure Laterality Date   • BREAST BIOPSY         No family history on file  Medications have been verified  Objective   BP (!) 145/106   Pulse 80   Resp 16   SpO2 97%        Physical Exam     Physical Exam  HENT:      Nose: Congestion present  Mouth/Throat:      Pharynx: No posterior oropharyngeal erythema  Cardiovascular:      Rate and Rhythm: Normal rate  Pulmonary:      Effort: Pulmonary effort is normal       Breath sounds: No wheezing, rhonchi or rales  Skin:     General: Skin is warm and dry  Neurological:      Mental Status: She is alert     Psychiatric:         Mood and Affect: Mood normal          Behavior: Behavior normal

## 2023-06-07 NOTE — LETTER
June 7, 2023     Patient: Monta Severs   YOB: 1975   Date of Visit: 6/7/2023       To Whom it May Concern:    Monta Severs was seen in my clinic on 6/7/2023  She is excused from work 06/07/2023    If you have any questions or concerns, please don't hesitate to call           Sincerely,          Lisa Baxter PA-C        CC: No Recipients

## 2023-12-22 ENCOUNTER — OFFICE VISIT (OUTPATIENT)
Dept: LAB | Facility: HOSPITAL | Age: 48
End: 2023-12-22
Payer: COMMERCIAL

## 2023-12-22 ENCOUNTER — HOSPITAL ENCOUNTER (OUTPATIENT)
Dept: RADIOLOGY | Facility: HOSPITAL | Age: 48
End: 2023-12-22
Payer: COMMERCIAL

## 2023-12-22 DIAGNOSIS — Z01.818 PRE-OP TESTING: ICD-10-CM

## 2023-12-22 DIAGNOSIS — Z01.811 PRE-OP CHEST EXAM: ICD-10-CM

## 2023-12-22 LAB
ATRIAL RATE: 74 BPM
P AXIS: 36 DEGREES
PR INTERVAL: 158 MS
QRS AXIS: -14 DEGREES
QRSD INTERVAL: 78 MS
QT INTERVAL: 368 MS
QTC INTERVAL: 408 MS
T WAVE AXIS: 14 DEGREES
VENTRICULAR RATE: 74 BPM

## 2023-12-22 PROCEDURE — 93005 ELECTROCARDIOGRAM TRACING: CPT

## 2023-12-22 PROCEDURE — 71046 X-RAY EXAM CHEST 2 VIEWS: CPT

## 2024-01-20 ENCOUNTER — HOSPITAL ENCOUNTER (EMERGENCY)
Facility: HOSPITAL | Age: 49
Discharge: HOME | End: 2024-01-20
Attending: STUDENT IN AN ORGANIZED HEALTH CARE EDUCATION/TRAINING PROGRAM
Payer: COMMERCIAL

## 2024-01-20 ENCOUNTER — APPOINTMENT (EMERGENCY)
Dept: RADIOLOGY | Facility: HOSPITAL | Age: 49
End: 2024-01-20
Payer: COMMERCIAL

## 2024-01-20 ENCOUNTER — APPOINTMENT (EMERGENCY)
Dept: CT IMAGING | Facility: HOSPITAL | Age: 49
End: 2024-01-20
Payer: COMMERCIAL

## 2024-01-20 VITALS
RESPIRATION RATE: 20 BRPM | OXYGEN SATURATION: 99 % | TEMPERATURE: 98.9 F | SYSTOLIC BLOOD PRESSURE: 123 MMHG | DIASTOLIC BLOOD PRESSURE: 68 MMHG | HEART RATE: 78 BPM

## 2024-01-20 DIAGNOSIS — N28.9 RENAL LESION: ICD-10-CM

## 2024-01-20 DIAGNOSIS — R06.02 SHORTNESS OF BREATH: Primary | ICD-10-CM

## 2024-01-20 LAB
ALBUMIN SERPL BCP-MCNC: 3.7 G/DL (ref 3.5–5)
ALP SERPL-CCNC: 91 U/L (ref 34–104)
ALT SERPL W P-5'-P-CCNC: 14 U/L (ref 7–52)
ANION GAP SERPL CALCULATED.3IONS-SCNC: 13 MMOL/L
AST SERPL W P-5'-P-CCNC: 19 U/L (ref 13–39)
BASOPHILS # BLD AUTO: 0.02 THOUSANDS/ÂΜL (ref 0–0.1)
BASOPHILS NFR BLD AUTO: 0 % (ref 0–1)
BILIRUB SERPL-MCNC: 0.78 MG/DL (ref 0.2–1)
BUN SERPL-MCNC: 13 MG/DL (ref 5–25)
CALCIUM SERPL-MCNC: 9 MG/DL (ref 8.4–10.2)
CARDIAC TROPONIN I PNL SERPL HS: 4 NG/L
CHLORIDE SERPL-SCNC: 100 MMOL/L (ref 96–108)
CO2 SERPL-SCNC: 23 MMOL/L (ref 21–32)
CREAT SERPL-MCNC: 0.59 MG/DL (ref 0.6–1.3)
D DIMER PPP FEU-MCNC: 3.25 UG/ML FEU
EOSINOPHIL # BLD AUTO: 0.09 THOUSAND/ÂΜL (ref 0–0.61)
EOSINOPHIL NFR BLD AUTO: 1 % (ref 0–6)
ERYTHROCYTE [DISTWIDTH] IN BLOOD BY AUTOMATED COUNT: 13.5 % (ref 11.6–15.1)
GFR SERPL CREATININE-BSD FRML MDRD: 110 ML/MIN/1.73SQ M
GLUCOSE SERPL-MCNC: 85 MG/DL (ref 65–140)
HCT VFR BLD AUTO: 35.7 % (ref 34.8–46.1)
HGB BLD-MCNC: 11.2 G/DL (ref 11.5–15.4)
IMM GRANULOCYTES # BLD AUTO: 0.03 THOUSAND/UL (ref 0–0.2)
IMM GRANULOCYTES NFR BLD AUTO: 0 % (ref 0–2)
LYMPHOCYTES # BLD AUTO: 0.77 THOUSANDS/ÂΜL (ref 0.6–4.47)
LYMPHOCYTES NFR BLD AUTO: 7 % (ref 14–44)
MCH RBC QN AUTO: 30.6 PG (ref 26.8–34.3)
MCHC RBC AUTO-ENTMCNC: 31.4 G/DL (ref 31.4–37.4)
MCV RBC AUTO: 98 FL (ref 82–98)
MONOCYTES # BLD AUTO: 0.63 THOUSAND/ÂΜL (ref 0.17–1.22)
MONOCYTES NFR BLD AUTO: 6 % (ref 4–12)
NEUTROPHILS # BLD AUTO: 9.29 THOUSANDS/ÂΜL (ref 1.85–7.62)
NEUTS SEG NFR BLD AUTO: 86 % (ref 43–75)
NRBC BLD AUTO-RTO: 0 /100 WBCS
PLATELET # BLD AUTO: 382 THOUSANDS/UL (ref 149–390)
PMV BLD AUTO: 10.4 FL (ref 8.9–12.7)
POTASSIUM SERPL-SCNC: 3.4 MMOL/L (ref 3.5–5.3)
PROT SERPL-MCNC: 7.4 G/DL (ref 6.4–8.4)
RBC # BLD AUTO: 3.66 MILLION/UL (ref 3.81–5.12)
SODIUM SERPL-SCNC: 136 MMOL/L (ref 135–147)
WBC # BLD AUTO: 10.83 THOUSAND/UL (ref 4.31–10.16)

## 2024-01-20 PROCEDURE — 36415 COLL VENOUS BLD VENIPUNCTURE: CPT | Performed by: STUDENT IN AN ORGANIZED HEALTH CARE EDUCATION/TRAINING PROGRAM

## 2024-01-20 PROCEDURE — 96366 THER/PROPH/DIAG IV INF ADDON: CPT

## 2024-01-20 PROCEDURE — 80053 COMPREHEN METABOLIC PANEL: CPT | Performed by: STUDENT IN AN ORGANIZED HEALTH CARE EDUCATION/TRAINING PROGRAM

## 2024-01-20 PROCEDURE — 84484 ASSAY OF TROPONIN QUANT: CPT | Performed by: STUDENT IN AN ORGANIZED HEALTH CARE EDUCATION/TRAINING PROGRAM

## 2024-01-20 PROCEDURE — 96365 THER/PROPH/DIAG IV INF INIT: CPT

## 2024-01-20 PROCEDURE — 85025 COMPLETE CBC W/AUTO DIFF WBC: CPT | Performed by: STUDENT IN AN ORGANIZED HEALTH CARE EDUCATION/TRAINING PROGRAM

## 2024-01-20 PROCEDURE — 96361 HYDRATE IV INFUSION ADD-ON: CPT

## 2024-01-20 PROCEDURE — 71046 X-RAY EXAM CHEST 2 VIEWS: CPT

## 2024-01-20 PROCEDURE — G1004 CDSM NDSC: HCPCS

## 2024-01-20 PROCEDURE — 96375 TX/PRO/DX INJ NEW DRUG ADDON: CPT

## 2024-01-20 PROCEDURE — 85379 FIBRIN DEGRADATION QUANT: CPT | Performed by: STUDENT IN AN ORGANIZED HEALTH CARE EDUCATION/TRAINING PROGRAM

## 2024-01-20 PROCEDURE — 71275 CT ANGIOGRAPHY CHEST: CPT

## 2024-01-20 PROCEDURE — 93005 ELECTROCARDIOGRAM TRACING: CPT

## 2024-01-20 PROCEDURE — 99285 EMERGENCY DEPT VISIT HI MDM: CPT | Performed by: STUDENT IN AN ORGANIZED HEALTH CARE EDUCATION/TRAINING PROGRAM

## 2024-01-20 PROCEDURE — 99284 EMERGENCY DEPT VISIT MOD MDM: CPT

## 2024-01-20 RX ORDER — HYDROXYZINE HYDROCHLORIDE 25 MG/1
25 TABLET, FILM COATED ORAL ONCE
Status: COMPLETED | OUTPATIENT
Start: 2024-01-20 | End: 2024-01-20

## 2024-01-20 RX ORDER — DIPHENHYDRAMINE HYDROCHLORIDE 50 MG/ML
25 INJECTION INTRAMUSCULAR; INTRAVENOUS ONCE
Status: COMPLETED | OUTPATIENT
Start: 2024-01-20 | End: 2024-01-20

## 2024-01-20 RX ORDER — ACETAMINOPHEN 10 MG/ML
1000 INJECTION, SOLUTION INTRAVENOUS ONCE
Status: COMPLETED | OUTPATIENT
Start: 2024-01-20 | End: 2024-01-20

## 2024-01-20 RX ORDER — POTASSIUM CHLORIDE 20 MEQ/1
20 TABLET, EXTENDED RELEASE ORAL ONCE
Status: COMPLETED | OUTPATIENT
Start: 2024-01-20 | End: 2024-01-20

## 2024-01-20 RX ADMIN — IOHEXOL 85 ML: 350 INJECTION, SOLUTION INTRAVENOUS at 19:27

## 2024-01-20 RX ADMIN — SODIUM CHLORIDE 1000 ML: 0.9 INJECTION, SOLUTION INTRAVENOUS at 19:47

## 2024-01-20 RX ADMIN — POTASSIUM CHLORIDE 20 MEQ: 1500 TABLET, EXTENDED RELEASE ORAL at 19:42

## 2024-01-20 RX ADMIN — DIPHENHYDRAMINE HYDROCHLORIDE 25 MG: 50 INJECTION, SOLUTION INTRAMUSCULAR; INTRAVENOUS at 19:42

## 2024-01-20 RX ADMIN — HYDROXYZINE HYDROCHLORIDE 25 MG: 25 TABLET, FILM COATED ORAL at 19:42

## 2024-01-20 RX ADMIN — ACETAMINOPHEN 1000 MG: 10 INJECTION INTRAVENOUS at 17:54

## 2024-01-20 NOTE — ED PROVIDER NOTES
"History  Chief Complaint   Patient presents with    Post-op Problem     Patient reports having gastric bypass surgery (sleeve) 2 weeks ago. Patient reports she has not been moving around as often due to knee pain. Patient report PCP told her to get a pulse ox and monitor her oxygen. Patient reports she was told to go to ER if oxygen drops below 95%. Patient reports SOB and \"very little chest pain.\" Patient reports sent over to r/o PE.      HPI    Patient is a 48-year-old female present emerged department for shortness of breath.  Patient notes that she has increased discomfort with exertion.  Patient had a gastric bypass sleeve performed approximately 2 weeks ago.  Patient reports not moving around as much and having increased pain in her knees.  Patient was informed by her primary care provider to get a pulse ox to monitor for signs of concern for embolism.  Patient reports having a pulse ox of 95 and coming in for further evaluation.  Patient does have some intermittent chest pain with the shortness of breath but denies any radiation or diaphoresis.  Denies any family history of cardiac concern.  Past medical history includes asthma.  Patient's never been a smoker.    Prior to Admission Medications   Prescriptions Last Dose Informant Patient Reported? Taking?   COLLAGEN PO   Yes No   Sig: Take 11 g by mouth daily   Cholecalciferol (VITAMIN D3 PO)  Self Yes No   Sig: Take 5,000 Units by mouth daily    FLUoxetine (PROzac) 60 MG TABS   Yes No   Sig: Take 60 mg by mouth daily   FeroSul 325 (65 Fe) MG tablet   Yes No   Sig: Take 1 tablet by mouth daily with breakfast   QUEtiapine (SEROquel) 100 mg tablet   Yes No   Sig: Take 100 mg by mouth every evening   TRIAMCINOLONE ACET-CICLOPIROX EX   Yes No   Sig: Apply topically   cyclobenzaprine (FLEXERIL) 5 mg tablet  Self Yes No   Sig: Take 5 mg by mouth 2 (two) times a day as needed for muscle spasms    Patient not taking: Reported on 6/7/2023   ergocalciferol " (ERGOCALCIFEROL) 1.25 MG (78782 UT) capsule   Yes No   Sig: Take 1 capsule by mouth once a week   ferrous gluconate (FERGON) 324 mg tablet  Self Yes No   Sig: Take 324 mg by mouth 2 (two) times a day   Patient not taking: Reported on 6/7/2023   lamoTRIgine (LaMICtal) 25 mg tablet   Yes No   Sig: Take 25 mg by mouth daily   meloxicam (MOBIC) 15 mg tablet  Self Yes No   Sig: Take 15 mg by mouth daily   methylPREDNISolone 4 MG tablet therapy pack   No No   Sig: Use as directed on package      Facility-Administered Medications: None       Past Medical History:   Diagnosis Date    Asthma        Past Surgical History:   Procedure Laterality Date    BREAST BIOPSY         No family history on file.  I have reviewed and agree with the history as documented.    E-Cigarette/Vaping     E-Cigarette/Vaping Substances     Social History     Tobacco Use    Smoking status: Never   Substance Use Topics    Alcohol use: Not Currently    Drug use: Never       Review of Systems   Constitutional:  Negative for chills and fever.   HENT:  Negative for ear pain and sore throat.    Eyes:  Negative for pain and visual disturbance.   Respiratory:  Positive for shortness of breath. Negative for cough.    Cardiovascular:  Negative for chest pain and palpitations.   Gastrointestinal:  Negative for abdominal pain and vomiting.   Genitourinary:  Negative for dysuria and hematuria.   Musculoskeletal:  Negative for arthralgias and back pain.   Skin:  Negative for color change and rash.   Neurological:  Negative for seizures and syncope.   All other systems reviewed and are negative.      Physical Exam  Physical Exam  Vitals and nursing note reviewed.   Constitutional:       General: She is not in acute distress.     Appearance: She is well-developed.   HENT:      Head: Normocephalic and atraumatic.   Eyes:      Conjunctiva/sclera: Conjunctivae normal.   Cardiovascular:      Rate and Rhythm: Normal rate and regular rhythm.      Heart sounds: No murmur  heard.  Pulmonary:      Effort: Pulmonary effort is normal. No respiratory distress.      Breath sounds: Normal breath sounds.   Abdominal:      Palpations: Abdomen is soft.      Tenderness: There is no abdominal tenderness.   Musculoskeletal:         General: No swelling.      Cervical back: Neck supple.   Skin:     General: Skin is warm and dry.      Capillary Refill: Capillary refill takes less than 2 seconds.   Neurological:      General: No focal deficit present.      Mental Status: She is alert and oriented to person, place, and time.   Psychiatric:         Mood and Affect: Mood normal.         Vital Signs  ED Triage Vitals [01/20/24 1648]   Temperature Pulse Respirations Blood Pressure SpO2   98.2 °F (36.8 °C) 99 18 125/80 100 %      Temp Source Heart Rate Source Patient Position - Orthostatic VS BP Location FiO2 (%)   Temporal Monitor Sitting Left arm --      Pain Score       --           Vitals:    01/20/24 1648 01/20/24 1845 01/20/24 2224   BP: 125/80 131/76 123/68   Pulse: 99 87 78   Patient Position - Orthostatic VS: Sitting           Visual Acuity      ED Medications  Medications   acetaminophen (Ofirmev) injection 1,000 mg (0 mg Intravenous Stopped 1/20/24 1937)   iohexol (OMNIPAQUE) 350 MG/ML injection (MULTI-DOSE) 85 mL (85 mL Intravenous Given 1/20/24 1927)   diphenhydrAMINE (BENADRYL) injection 25 mg (25 mg Intravenous Given 1/20/24 1942)   hydrOXYzine HCL (ATARAX) tablet 25 mg (25 mg Oral Given 1/20/24 1942)   sodium chloride 0.9 % bolus 1,000 mL (0 mL Intravenous Stopped 1/20/24 2225)   potassium chloride (K-DUR,KLOR-CON) CR tablet 20 mEq (20 mEq Oral Given 1/20/24 1942)       Diagnostic Studies  Results Reviewed       Procedure Component Value Units Date/Time    D-dimer, quantitative [[id number]]  (Abnormal) Collected: 01/20/24 1840    Lab Status: Final result Specimen: Blood from Arm, Right Updated: 01/20/24 1905     D-Dimer, Quant 3.25 ug/ml FEU     HS Troponin 0hr (reflex protocol) [[id number]]   (Normal) Collected: 01/20/24 1754    Lab Status: Final result Specimen: Blood from Arm, Right Updated: 01/20/24 1825     hs TnI 0hr 4 ng/L     Comprehensive metabolic panel [[id number]]  (Abnormal) Collected: 01/20/24 1754    Lab Status: Final result Specimen: Blood from Arm, Right Updated: 01/20/24 1821     Sodium 136 mmol/L      Potassium 3.4 mmol/L      Chloride 100 mmol/L      CO2 23 mmol/L      ANION GAP 13 mmol/L      BUN 13 mg/dL      Creatinine 0.59 mg/dL      Glucose 85 mg/dL      Calcium 9.0 mg/dL      AST 19 U/L      ALT 14 U/L      Alkaline Phosphatase 91 U/L      Total Protein 7.4 g/dL      Albumin 3.7 g/dL      Total Bilirubin 0.78 mg/dL      eGFR 110 ml/min/1.73sq m     Narrative:      National Kidney Disease Foundation guidelines for Chronic Kidney Disease (CKD):     Stage 1 with normal or high GFR (GFR > 90 mL/min/1.73 square meters)    Stage 2 Mild CKD (GFR = 60-89 mL/min/1.73 square meters)    Stage 3A Moderate CKD (GFR = 45-59 mL/min/1.73 square meters)    Stage 3B Moderate CKD (GFR = 30-44 mL/min/1.73 square meters)    Stage 4 Severe CKD (GFR = 15-29 mL/min/1.73 square meters)    Stage 5 End Stage CKD (GFR <15 mL/min/1.73 square meters)  Note: GFR calculation is accurate only with a steady state creatinine    CBC and differential [[id number]]  (Abnormal) Collected: 01/20/24 1754    Lab Status: Final result Specimen: Blood from Arm, Right Updated: 01/20/24 1801     WBC 10.83 Thousand/uL      RBC 3.66 Million/uL      Hemoglobin 11.2 g/dL      Hematocrit 35.7 %      MCV 98 fL      MCH 30.6 pg      MCHC 31.4 g/dL      RDW 13.5 %      MPV 10.4 fL      Platelets 382 Thousands/uL      nRBC 0 /100 WBCs      Neutrophils Relative 86 %      Immat GRANS % 0 %      Lymphocytes Relative 7 %      Monocytes Relative 6 %      Eosinophils Relative 1 %      Basophils Relative 0 %      Neutrophils Absolute 9.29 Thousands/µL      Immature Grans Absolute 0.03 Thousand/uL      Lymphocytes Absolute 0.77 Thousands/µL       Monocytes Absolute 0.63 Thousand/µL      Eosinophils Absolute 0.09 Thousand/µL      Basophils Absolute 0.02 Thousands/µL                    CTA ED chest PE Study   Final Result by Diann Hwang MD (01/20 2034)      No acute pulmonary emboli, but sensitivity is decreased by suboptimal opacification of the pulmonary arteries and by motion artifact which creates artifactual filling defects.      Indeterminate 1.5 cm right renal lesion measuring 70 Hounsfield units, new from the abdomen CT from 2021. Recommend follow-up with outpatient ultrasound to distinguish between a complex cyst and renal cell carcinoma.      This study was marked in Epic for immediate notification and follow-up.            Workstation performed: [id number]         XR chest 2 views   Final Result by Jenn Man MD (01/21 0925)      No acute consolidation or congestion   Hypoinflated lungs                  Workstation performed: [id number]                    Procedures  Procedures         ED Course             HEART Risk Score      Flowsheet Row Most Recent Value   Heart Score Risk Calculator    History 0 Filed at: 01/20/2024 2045   ECG 0 Filed at: 01/20/2024 2045   Age 1 Filed at: 01/20/2024 2045   Risk Factors 1 Filed at: 01/20/2024 2045   Troponin 0 Filed at: 01/20/2024 2045   HEART Score 2 Filed at: 01/20/2024 2045                          SBIRT 22yo+      Flowsheet Row Most Recent Value   Initial Alcohol Screen: US AUDIT-C     1. How often do you have a drink containing alcohol? 0 Filed at: 01/20/2024 1908   2. How many drinks containing alcohol do you have on a typical day you are drinking?  0 Filed at: 01/20/2024 1908   3b. FEMALE Any Age, or MALE 65+: How often do you have 4 or more drinks on one occassion? 0 Filed at: 01/20/2024 1908   Audit-C Score 0 Filed at: 01/20/2024 1908   RADHA: How many times in the past year have you...    Used an illegal drug or used a prescription medication for non-medical reasons? Never Filed at:  01/20/2024 1908                      Medical Decision Making  Differential ACS, electrolyte abnormality, dehydration, PE    CBC CMP overall unremarkable.  Negative troponin.  Elevated D-dimer.  Chest x-ray showed no acute cardiopulmonary process.  CTA of the chest was performed showing a renal lesion without any acute pulmonary emboli.    Patient was given a liter of fluids as she appeared dehydrated on examination.    Discussed test results and findings with patient at bedside.  Discussed symptomatic management as well as return follow-up precautions.Discussed need for follow-up for the renal lesion.    EKG rate 86 NSR without signs of acute ischemic change. Compared to prior 12/23    Amount and/or Complexity of Data Reviewed  Labs: ordered.  Radiology: ordered and independent interpretation performed.  ECG/medicine tests: ordered and independent interpretation performed.    Risk  Prescription drug management.             Disposition  Final diagnoses:   Shortness of breath   Renal lesion     Time reflects when diagnosis was documented in both MDM as applicable and the Disposition within this note       Time User Action Codes Description Comment    1/20/2024  8:45 PM Polina Nolen Add [R06.02] Shortness of breath     1/20/2024  8:46 PM Polina Nolen Add [N28.9] Renal lesion           ED Disposition       ED Disposition   Discharge    Condition   Stable    Date/Time   Sat Jan 20, 2024 2045    Comment   Betzaida Rivera discharge to home/self care.                   Follow-up Information       Follow up With Specialties Details Why Contact Kvng Ulloa DO  Schedule an appointment as soon as possible for a visit  CT Findings [address] [facility] [address]  [phone]              Discharge Medication List as of 1/20/2024  8:47 PM        CONTINUE these medications which have NOT CHANGED    Details   Cholecalciferol (VITAMIN D3 PO) Take 5,000 Units by mouth daily , Historical Med      COLLAGEN PO Take 11 g  by mouth daily, Historical Med      cyclobenzaprine (FLEXERIL) 5 mg tablet Take 5 mg by mouth 2 (two) times a day as needed for muscle spasms , Historical Med      ergocalciferol (ERGOCALCIFEROL) 1.25 MG (01086 UT) capsule Take 1 capsule by mouth once a week, Historical Med      FeroSul 325 (65 Fe) MG tablet Take 1 tablet by mouth daily with breakfast, Starting Wed 4/5/2023, Historical Med      ferrous gluconate (FERGON) 324 mg tablet Take 324 mg by mouth 2 (two) times a day, Historical Med      FLUoxetine (PROzac) 60 MG TABS Take 60 mg by mouth daily, Starting Fri 6/2/2023, Historical Med      lamoTRIgine (LaMICtal) 25 mg tablet Take 25 mg by mouth daily, Starting Fri 6/2/2023, Historical Med      meloxicam (MOBIC) 15 mg tablet Take 15 mg by mouth daily, Historical Med      methylPREDNISolone 4 MG tablet therapy pack Use as directed on package, Normal      QUEtiapine (SEROquel) 100 mg tablet Take 100 mg by mouth every evening, Starting Fri 6/2/2023, Historical Med      TRIAMCINOLONE ACET-CICLOPIROX EX Apply topically, Historical Med             No discharge procedures on file.    PDMP Review       None            ED Provider  Electronically Signed by             Polina Nolen DO  01/21/24 4460

## 2024-01-21 LAB
ATRIAL RATE: 86 BPM
P AXIS: 30 DEGREES
PR INTERVAL: 144 MS
QRS AXIS: 30 DEGREES
QRSD INTERVAL: 78 MS
QT INTERVAL: 358 MS
QTC INTERVAL: 428 MS
T WAVE AXIS: 5 DEGREES
VENTRICULAR RATE: 86 BPM

## 2024-01-21 NOTE — DISCHARGE INSTRUCTIONS
Continues over-the-counter medications as needed for discomfort.  Continue taking all your previously prescribed medications.  Try to stay well-hydrated.    Follow-up with your primary care physician for the abnormal CAT scan finding.    Return if you have new or worsening symptoms.